# Patient Record
Sex: MALE | Race: WHITE | Employment: FULL TIME | URBAN - METROPOLITAN AREA
[De-identification: names, ages, dates, MRNs, and addresses within clinical notes are randomized per-mention and may not be internally consistent; named-entity substitution may affect disease eponyms.]

---

## 2017-10-06 PROBLEM — E11.9 CONTROLLED TYPE 2 DIABETES MELLITUS WITHOUT COMPLICATION, WITHOUT LONG-TERM CURRENT USE OF INSULIN (HCC): Status: ACTIVE | Noted: 2017-10-06

## 2017-10-06 PROBLEM — M51.26 HERNIATED LUMBAR INTERVERTEBRAL DISC: Status: ACTIVE | Noted: 2017-10-06

## 2017-10-06 PROBLEM — G89.29 CHRONIC RIGHT-SIDED LOW BACK PAIN WITHOUT SCIATICA: Status: ACTIVE | Noted: 2017-10-06

## 2017-10-06 PROBLEM — M54.50 CHRONIC RIGHT-SIDED LOW BACK PAIN WITHOUT SCIATICA: Status: ACTIVE | Noted: 2017-10-06

## 2017-11-16 PROBLEM — IMO0002 UNCONTROLLED TYPE 2 DIABETES MELLITUS WITH COMPLICATION: Status: ACTIVE | Noted: 2017-10-06

## 2017-11-16 PROBLEM — F17.210 CIGARETTE NICOTINE DEPENDENCE WITHOUT COMPLICATION: Status: ACTIVE | Noted: 2017-11-16

## 2017-11-16 PROBLEM — E11.65 UNCONTROLLED TYPE 2 DIABETES MELLITUS WITH COMPLICATION (HCC): Status: ACTIVE | Noted: 2017-10-06

## 2017-11-16 PROBLEM — E11.8 UNCONTROLLED TYPE 2 DIABETES MELLITUS WITH COMPLICATION (HCC): Status: ACTIVE | Noted: 2017-10-06

## 2018-01-08 PROCEDURE — 81003 URINALYSIS AUTO W/O SCOPE: CPT | Performed by: FAMILY MEDICINE

## 2018-01-08 PROCEDURE — 82570 ASSAY OF URINE CREATININE: CPT | Performed by: FAMILY MEDICINE

## 2018-01-08 PROCEDURE — 82043 UR ALBUMIN QUANTITATIVE: CPT | Performed by: FAMILY MEDICINE

## 2018-01-26 PROCEDURE — 87338 HPYLORI STOOL AG IA: CPT | Performed by: FAMILY MEDICINE

## 2018-08-27 PROCEDURE — 81003 URINALYSIS AUTO W/O SCOPE: CPT | Performed by: FAMILY MEDICINE

## 2018-08-27 PROCEDURE — 87086 URINE CULTURE/COLONY COUNT: CPT | Performed by: FAMILY MEDICINE

## 2019-11-04 PROBLEM — E78.2 MIXED HYPERLIPIDEMIA DUE TO TYPE 2 DIABETES MELLITUS (HCC): Status: ACTIVE | Noted: 2019-11-04

## 2019-11-04 PROBLEM — E78.2 MIXED HYPERLIPIDEMIA DUE TO TYPE 2 DIABETES MELLITUS: Status: ACTIVE | Noted: 2019-11-04

## 2019-11-04 PROBLEM — E11.69 MIXED HYPERLIPIDEMIA DUE TO TYPE 2 DIABETES MELLITUS (HCC): Status: ACTIVE | Noted: 2019-11-04

## 2019-11-04 PROBLEM — E78.2 MIXED HYPERLIPIDEMIA DUE TO TYPE 2 DIABETES MELLITUS  (HCC): Status: ACTIVE | Noted: 2019-11-04

## 2019-11-04 PROBLEM — E11.69 MIXED HYPERLIPIDEMIA DUE TO TYPE 2 DIABETES MELLITUS  (HCC): Status: ACTIVE | Noted: 2019-11-04

## 2019-11-04 PROBLEM — E11.69 MIXED HYPERLIPIDEMIA DUE TO TYPE 2 DIABETES MELLITUS: Status: ACTIVE | Noted: 2019-11-04

## 2022-05-19 ENCOUNTER — APPOINTMENT (OUTPATIENT)
Dept: CT IMAGING | Age: 47
End: 2022-05-19
Attending: EMERGENCY MEDICINE
Payer: COMMERCIAL

## 2022-05-19 ENCOUNTER — APPOINTMENT (OUTPATIENT)
Dept: GENERAL RADIOLOGY | Age: 47
End: 2022-05-19
Attending: EMERGENCY MEDICINE
Payer: COMMERCIAL

## 2022-05-19 ENCOUNTER — HOSPITAL ENCOUNTER (INPATIENT)
Facility: HOSPITAL | Age: 47
LOS: 1 days | Discharge: HOME | End: 2022-05-21
Attending: EMERGENCY MEDICINE | Admitting: SURGERY
Payer: COMMERCIAL

## 2022-05-19 DIAGNOSIS — S27.2XXA TRAUMATIC PNEUMOHEMOTHORAX, INITIAL ENCOUNTER: Primary | ICD-10-CM

## 2022-05-19 DIAGNOSIS — S22.42XA CLOSED FRACTURE OF MULTIPLE RIBS OF LEFT SIDE, INITIAL ENCOUNTER: ICD-10-CM

## 2022-05-19 LAB
ALBUMIN SERPL-MCNC: 4 G/DL (ref 3.4–5)
ALBUMIN/GLOB SERPL: 1 {RATIO} (ref 1–2)
ALP LIVER SERPL-CCNC: 100 U/L
ALT SERPL-CCNC: 52 U/L
ANION GAP SERPL CALC-SCNC: 11 MMOL/L (ref 0–18)
AST SERPL-CCNC: 22 U/L (ref 15–37)
BASOPHILS # BLD AUTO: 0.11 X10(3) UL (ref 0–0.2)
BASOPHILS NFR BLD AUTO: 1.1 %
BILIRUB SERPL-MCNC: 0.2 MG/DL (ref 0.1–2)
BUN BLD-MCNC: 18 MG/DL (ref 7–18)
CALCIUM BLD-MCNC: 9.9 MG/DL (ref 8.5–10.1)
CHLORIDE SERPL-SCNC: 106 MMOL/L (ref 98–112)
CO2 SERPL-SCNC: 22 MMOL/L (ref 21–32)
CREAT BLD-MCNC: 1.08 MG/DL
EOSINOPHIL # BLD AUTO: 0.49 X10(3) UL (ref 0–0.7)
EOSINOPHIL NFR BLD AUTO: 5 %
ERYTHROCYTE [DISTWIDTH] IN BLOOD BY AUTOMATED COUNT: 12.4 %
ETHANOL SERPL-MCNC: 56 MG/DL (ref ?–3)
GLOBULIN PLAS-MCNC: 3.9 G/DL (ref 2.8–4.4)
GLUCOSE BLD-MCNC: 196 MG/DL (ref 70–99)
HCT VFR BLD AUTO: 48.8 %
HGB BLD-MCNC: 16.9 G/DL
IMM GRANULOCYTES # BLD AUTO: 0.12 X10(3) UL (ref 0–1)
IMM GRANULOCYTES NFR BLD: 1.2 %
LYMPHOCYTES # BLD AUTO: 3.01 X10(3) UL (ref 1–4)
LYMPHOCYTES NFR BLD AUTO: 30.6 %
MCH RBC QN AUTO: 31 PG (ref 26–34)
MCHC RBC AUTO-ENTMCNC: 34.6 G/DL (ref 31–37)
MCV RBC AUTO: 89.5 FL
MONOCYTES # BLD AUTO: 0.72 X10(3) UL (ref 0.1–1)
MONOCYTES NFR BLD AUTO: 7.3 %
NEUTROPHILS # BLD AUTO: 5.4 X10 (3) UL (ref 1.5–7.7)
NEUTROPHILS # BLD AUTO: 5.4 X10(3) UL (ref 1.5–7.7)
NEUTROPHILS NFR BLD AUTO: 54.8 %
OSMOLALITY SERPL CALC.SUM OF ELEC: 295 MOSM/KG (ref 275–295)
PLATELET # BLD AUTO: 266 10(3)UL (ref 150–450)
POTASSIUM SERPL-SCNC: 3.8 MMOL/L (ref 3.5–5.1)
PROT SERPL-MCNC: 7.9 G/DL (ref 6.4–8.2)
RBC # BLD AUTO: 5.45 X10(6)UL
SARS-COV-2 RNA RESP QL NAA+PROBE: NOT DETECTED
SODIUM SERPL-SCNC: 139 MMOL/L (ref 136–145)
TROPONIN I HIGH SENSITIVITY: <3 NG/L
WBC # BLD AUTO: 9.9 X10(3) UL (ref 4–11)

## 2022-05-19 PROCEDURE — 71260 CT THORAX DX C+: CPT | Performed by: EMERGENCY MEDICINE

## 2022-05-19 PROCEDURE — 72125 CT NECK SPINE W/O DYE: CPT | Performed by: EMERGENCY MEDICINE

## 2022-05-19 PROCEDURE — 0W9B30Z DRAINAGE OF LEFT PLEURAL CAVITY WITH DRAINAGE DEVICE, PERCUTANEOUS APPROACH: ICD-10-PCS | Performed by: EMERGENCY MEDICINE

## 2022-05-19 PROCEDURE — 3046F HEMOGLOBIN A1C LEVEL >9.0%: CPT | Performed by: FAMILY MEDICINE

## 2022-05-19 PROCEDURE — 70450 CT HEAD/BRAIN W/O DYE: CPT | Performed by: EMERGENCY MEDICINE

## 2022-05-19 PROCEDURE — 71045 X-RAY EXAM CHEST 1 VIEW: CPT | Performed by: EMERGENCY MEDICINE

## 2022-05-19 PROCEDURE — 74177 CT ABD & PELVIS W/CONTRAST: CPT | Performed by: EMERGENCY MEDICINE

## 2022-05-19 RX ORDER — HYDROMORPHONE HYDROCHLORIDE 1 MG/ML
0.5 INJECTION, SOLUTION INTRAMUSCULAR; INTRAVENOUS; SUBCUTANEOUS EVERY 30 MIN PRN
Status: DISCONTINUED | OUTPATIENT
Start: 2022-05-19 | End: 2022-05-19

## 2022-05-19 RX ORDER — MORPHINE SULFATE 4 MG/ML
4 INJECTION, SOLUTION INTRAMUSCULAR; INTRAVENOUS ONCE
Status: COMPLETED | OUTPATIENT
Start: 2022-05-19 | End: 2022-05-19

## 2022-05-19 RX ORDER — HYDROMORPHONE HYDROCHLORIDE 1 MG/ML
1 INJECTION, SOLUTION INTRAMUSCULAR; INTRAVENOUS; SUBCUTANEOUS EVERY 30 MIN PRN
Status: COMPLETED | OUTPATIENT
Start: 2022-05-19 | End: 2022-05-20

## 2022-05-19 RX ORDER — KETOROLAC TROMETHAMINE 15 MG/ML
15 INJECTION, SOLUTION INTRAMUSCULAR; INTRAVENOUS ONCE
Status: COMPLETED | OUTPATIENT
Start: 2022-05-19 | End: 2022-05-19

## 2022-05-20 ENCOUNTER — APPOINTMENT (OUTPATIENT)
Dept: GENERAL RADIOLOGY | Facility: HOSPITAL | Age: 47
End: 2022-05-20
Attending: PHYSICIAN ASSISTANT
Payer: COMMERCIAL

## 2022-05-20 PROBLEM — S22.42XA CLOSED FRACTURE OF MULTIPLE RIBS OF LEFT SIDE, INITIAL ENCOUNTER: Status: ACTIVE | Noted: 2022-05-20

## 2022-05-20 LAB
AMPHET UR QL SCN: NEGATIVE
APTT PPP: 24.5 SECONDS (ref 23.3–35.6)
BENZODIAZ UR QL SCN: NEGATIVE
CANNABINOIDS UR QL SCN: NEGATIVE
CREAT UR-SCNC: 90 MG/DL
EST. AVERAGE GLUCOSE BLD GHB EST-MCNC: 258 MG/DL (ref 68–126)
GLUCOSE BLD-MCNC: 175 MG/DL (ref 70–99)
GLUCOSE BLD-MCNC: 188 MG/DL (ref 70–99)
GLUCOSE BLD-MCNC: 263 MG/DL (ref 70–99)
GLUCOSE BLD-MCNC: 303 MG/DL (ref 70–99)
GLUCOSE BLD-MCNC: 313 MG/DL (ref 70–99)
HBA1C MFR BLD: 10.6 % (ref ?–5.7)
INR BLD: 0.96 (ref 0.8–1.2)
MDMA UR QL SCN: NEGATIVE
PROTHROMBIN TIME: 12.8 SECONDS (ref 11.6–14.8)

## 2022-05-20 PROCEDURE — 99254 IP/OBS CNSLTJ NEW/EST MOD 60: CPT | Performed by: SURGERY

## 2022-05-20 PROCEDURE — 71045 X-RAY EXAM CHEST 1 VIEW: CPT | Performed by: PHYSICIAN ASSISTANT

## 2022-05-20 RX ORDER — KETOROLAC TROMETHAMINE 30 MG/ML
30 INJECTION, SOLUTION INTRAMUSCULAR; INTRAVENOUS EVERY 6 HOURS PRN
Status: DISCONTINUED | OUTPATIENT
Start: 2022-05-20 | End: 2022-05-21

## 2022-05-20 RX ORDER — HYDROCODONE BITARTRATE AND ACETAMINOPHEN 5; 325 MG/1; MG/1
1 TABLET ORAL EVERY 4 HOURS PRN
Status: DISCONTINUED | OUTPATIENT
Start: 2022-05-20 | End: 2022-05-20

## 2022-05-20 RX ORDER — NICOTINE POLACRILEX 4 MG
15 LOZENGE BUCCAL
Status: DISCONTINUED | OUTPATIENT
Start: 2022-05-20 | End: 2022-05-21

## 2022-05-20 RX ORDER — KETOROLAC TROMETHAMINE 15 MG/ML
15 INJECTION, SOLUTION INTRAMUSCULAR; INTRAVENOUS EVERY 6 HOURS PRN
Status: DISCONTINUED | OUTPATIENT
Start: 2022-05-20 | End: 2022-05-21

## 2022-05-20 RX ORDER — ACETAMINOPHEN 500 MG
500 TABLET ORAL EVERY 6 HOURS PRN
Status: DISCONTINUED | OUTPATIENT
Start: 2022-05-20 | End: 2022-05-21

## 2022-05-20 RX ORDER — HEPARIN SODIUM 5000 [USP'U]/ML
5000 INJECTION, SOLUTION INTRAVENOUS; SUBCUTANEOUS EVERY 8 HOURS SCHEDULED
Status: DISCONTINUED | OUTPATIENT
Start: 2022-05-20 | End: 2022-05-21

## 2022-05-20 RX ORDER — OXYCODONE HYDROCHLORIDE 5 MG/1
5 TABLET ORAL EVERY 4 HOURS PRN
Status: DISCONTINUED | OUTPATIENT
Start: 2022-05-20 | End: 2022-05-21

## 2022-05-20 RX ORDER — DEXTROSE MONOHYDRATE 25 G/50ML
50 INJECTION, SOLUTION INTRAVENOUS
Status: DISCONTINUED | OUTPATIENT
Start: 2022-05-20 | End: 2022-05-21

## 2022-05-20 RX ORDER — NICOTINE POLACRILEX 4 MG
30 LOZENGE BUCCAL
Status: DISCONTINUED | OUTPATIENT
Start: 2022-05-20 | End: 2022-05-21

## 2022-05-20 RX ORDER — PROCHLORPERAZINE EDISYLATE 5 MG/ML
5 INJECTION INTRAMUSCULAR; INTRAVENOUS EVERY 8 HOURS PRN
Status: DISCONTINUED | OUTPATIENT
Start: 2022-05-20 | End: 2022-05-21

## 2022-05-20 RX ORDER — LORAZEPAM 2 MG/ML
1 INJECTION INTRAMUSCULAR
Status: DISCONTINUED | OUTPATIENT
Start: 2022-05-20 | End: 2022-05-21

## 2022-05-20 RX ORDER — LORAZEPAM 1 MG/1
1 TABLET ORAL
Status: DISCONTINUED | OUTPATIENT
Start: 2022-05-20 | End: 2022-05-21

## 2022-05-20 RX ORDER — ACETAMINOPHEN 500 MG
1000 TABLET ORAL EVERY 6 HOURS PRN
Status: DISCONTINUED | OUTPATIENT
Start: 2022-05-20 | End: 2022-05-21

## 2022-05-20 RX ORDER — LORAZEPAM 2 MG/ML
2 INJECTION INTRAMUSCULAR
Status: DISCONTINUED | OUTPATIENT
Start: 2022-05-20 | End: 2022-05-21

## 2022-05-20 RX ORDER — ONDANSETRON 2 MG/ML
4 INJECTION INTRAMUSCULAR; INTRAVENOUS EVERY 6 HOURS PRN
Status: DISCONTINUED | OUTPATIENT
Start: 2022-05-20 | End: 2022-05-21

## 2022-05-20 RX ORDER — LORAZEPAM 1 MG/1
2 TABLET ORAL
Status: DISCONTINUED | OUTPATIENT
Start: 2022-05-20 | End: 2022-05-21
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
## 2022-05-20 NOTE — ED QUICK NOTES
Orders for admission, patient is aware of plan and ready to go upstairs. Any questions, please call ED JOSE ALFREDO Fermin at extension [phone]. Vaccinated? no  Type of COVID test sent: Rapid   COVID Suspicion level: Low      Titratable drug(s) infusing: none  Rate: n/a    LOC at time of transport:  A+Ox3    Other pertinent information: chest tube     CIWA score=n/a   NIH score=n/a

## 2022-05-20 NOTE — BH PROGRESS NOTE
The patient was seen by Joan [facility] for possible cd treatment options. The patient declined services and stated he didn't have a problem. [facility] Intervention Note up loaded to this record.

## 2022-05-20 NOTE — PLAN OF CARE
NURSING ADMISSION NOTE      Patient admitted via ambulance from  ER, accompanied by wife  Joan on 3L/NC, sats >95%  Chest tube -20cm attached to continous suction  C/o left sided incision site pain 7/10, pain meds given. Ciwa - 0-3  C/o hungry, not eaten yet. foods given. Pt is only taking glipizide, not taking BP med. Pt not checking his blood sugar at home, pt has glucometer at home. Pamplet on diabetes education given. Per pt he didn't drink alcohol prior to admission. He drinks alcohol not everyday bu admitted he used cocaine before the admission  Labs in am  Will continue to monitor. Oriented to room. Safety precautions initiated. Bed in low position. Call light in reach.   Problem: Diabetes/Glucose Control  Goal: Glucose maintained within prescribed range  Description: INTERVENTIONS:  - Monitor Blood Glucose as ordered  - Assess for signs and symptoms of hyperglycemia and hypoglycemia  - Administer ordered medications to maintain glucose within target range  - Assess barriers to adequate nutritional intake and initiate nutrition consult as needed  - Instruct patient on self management of diabetes  Outcome: Progressing     Problem: Patient/Family Goals  Goal: Patient/Family Long Term Goal  Description: Patient's Long Term Goal:resolve pneumothorax    Interventions:  - Chest tube -20cm  -consult  -comply with POC  - See additional Care Plan goals for specific interventions  Outcome: Progressing  Goal: Patient/Family Short Term Goal  Description: Patient's Short Term Goal:pain controlled    Interventions:   - pain med as needed  - See additional Care Plan goals for specific interventions  Outcome: Progressing     Problem: RESPIRATORY - ADULT  Goal: Achieves optimal ventilation and oxygenation  Description: INTERVENTIONS:  - Assess for changes in respiratory status  - Assess for changes in mentation and behavior  - Position to facilitate oxygenation and minimize respiratory effort  - Oxygen supplementation based on oxygen saturation or ABGs  - Provide Smoking Cessation handout, if applicable  - Encourage broncho-pulmonary hygiene including cough, deep breathe, Incentive Spirometry  - Assess the need for suctioning and perform as needed  - Assess and instruct to report SOB or any respiratory difficulty  - Respiratory Therapy support as indicated  - Manage/alleviate anxiety  - Monitor for signs/symptoms of CO2 retention  Outcome: Progressing     Problem: PAIN - ADULT  Goal: Verbalizes/displays adequate comfort level or patient's stated pain goal  Description: INTERVENTIONS:  - Encourage pt to monitor pain and request assistance  - Assess pain using appropriate pain scale  - Administer analgesics based on type and severity of pain and evaluate response  - Implement non-pharmacological measures as appropriate and evaluate response  - Consider cultural and social influences on pain and pain management  - Manage/alleviate anxiety  - Utilize distraction and/or relaxation techniques  - Monitor for opioid side effects  - Notify MD/LIP if interventions unsuccessful or patient reports new pain  - Anticipate increased pain with activity and pre-medicate as appropriate  Outcome: Progressing

## 2022-05-20 NOTE — PROGRESS NOTES
Consult request received from ER; case discussed and data reviewed. Events noted. Left ribs fx'd and moderate PTx; SpO2 declines off O2    Recommend Pigtail Catheter for PTx + supplemental O2  Trauma to manage chest tube    Optimize analgesia    Imagine reveals no other traumatic injuries; risk for pulmonary contusion. Full consult to follow.        Janneth Cronin MD

## 2022-05-21 ENCOUNTER — APPOINTMENT (OUTPATIENT)
Dept: GENERAL RADIOLOGY | Facility: HOSPITAL | Age: 47
End: 2022-05-21
Payer: COMMERCIAL

## 2022-05-21 VITALS
HEIGHT: 70 IN | SYSTOLIC BLOOD PRESSURE: 139 MMHG | DIASTOLIC BLOOD PRESSURE: 90 MMHG | TEMPERATURE: 98 F | WEIGHT: 192.81 LBS | HEART RATE: 88 BPM | BODY MASS INDEX: 27.6 KG/M2 | OXYGEN SATURATION: 97 % | RESPIRATION RATE: 15 BRPM

## 2022-05-21 DIAGNOSIS — S27.2XXD TRAUMATIC PNEUMOHEMOTHORAX, SUBSEQUENT ENCOUNTER: Primary | ICD-10-CM

## 2022-05-21 LAB
ANION GAP SERPL CALC-SCNC: 0 MMOL/L (ref 0–18)
BUN BLD-MCNC: 16 MG/DL (ref 7–18)
CALCIUM BLD-MCNC: 8.8 MG/DL (ref 8.5–10.1)
CHLORIDE SERPL-SCNC: 107 MMOL/L (ref 98–112)
CHOLEST SERPL-MCNC: 193 MG/DL (ref ?–200)
CO2 SERPL-SCNC: 26 MMOL/L (ref 21–32)
CREAT BLD-MCNC: 0.83 MG/DL
GLUCOSE BLD-MCNC: 203 MG/DL (ref 70–99)
GLUCOSE BLD-MCNC: 203 MG/DL (ref 70–99)
GLUCOSE BLD-MCNC: 221 MG/DL (ref 70–99)
HDLC SERPL-MCNC: 53 MG/DL (ref 40–59)
LDLC SERPL CALC-MCNC: 117 MG/DL (ref ?–100)
MAGNESIUM SERPL-MCNC: 2 MG/DL (ref 1.6–2.6)
NONHDLC SERPL-MCNC: 140 MG/DL (ref ?–130)
OSMOLALITY SERPL CALC.SUM OF ELEC: 283 MOSM/KG (ref 275–295)
POTASSIUM SERPL-SCNC: 3.9 MMOL/L (ref 3.5–5.1)
SODIUM SERPL-SCNC: 133 MMOL/L (ref 136–145)
TRIGL SERPL-MCNC: 132 MG/DL (ref 30–149)
VLDLC SERPL CALC-MCNC: 23 MG/DL (ref 0–30)

## 2022-05-21 PROCEDURE — 71045 X-RAY EXAM CHEST 1 VIEW: CPT

## 2022-05-21 PROCEDURE — 99232 SBSQ HOSP IP/OBS MODERATE 35: CPT | Performed by: SURGERY

## 2022-05-21 RX ORDER — NALOXONE HYDROCHLORIDE 4 MG/.1ML
4 SPRAY, METERED NASAL AS NEEDED
Qty: 1 KIT | Refills: 0 | Status: SHIPPED | OUTPATIENT
Start: 2022-05-21

## 2022-05-21 RX ORDER — OXYCODONE HYDROCHLORIDE 5 MG/1
5 TABLET ORAL EVERY 4 HOURS PRN
Qty: 20 TABLET | Refills: 0 | Status: SHIPPED | OUTPATIENT
Start: 2022-05-21 | End: 2022-05-26

## 2022-05-21 RX ORDER — OXYCODONE HYDROCHLORIDE 5 MG/1
5 CAPSULE ORAL EVERY 4 HOURS PRN
Qty: 20 CAPSULE | Refills: 0 | Status: SHIPPED | OUTPATIENT
Start: 2022-05-21

## 2022-05-21 RX ORDER — ACETAMINOPHEN 500 MG
500 TABLET ORAL EVERY 6 HOURS PRN
Refills: 0 | Status: SHIPPED | COMMUNITY
Start: 2022-05-21 | End: 2022-05-21

## 2022-05-21 RX ORDER — ACETAMINOPHEN 500 MG
1000 TABLET ORAL EVERY 8 HOURS
Qty: 120 TABLET | Refills: 0 | Status: SHIPPED | OUTPATIENT
Start: 2022-05-21 | End: 2022-06-10

## 2022-05-21 NOTE — PROGRESS NOTES
NURSING DISCHARGE NOTE    Discharged Home via Ambulatory. Accompanied by Family member  Belongings Taken by patient/family. IV and tele removed. The patient verbalized understanding of discharge instructions.

## 2022-05-21 NOTE — PLAN OF CARE
Pt is A&O x4. VSS- NSR on tele. RA. Lung sounds diminished but clear. Chest tube to left anterior chest currently to -20cm suction. Dressing is CD&I. Pt c/o left sided rib cage and left shoulder pain management with PRN medications. Seizure precautions in place. CIWA monitoring discontinued this shift. Pt on carb controlled diet with accuchecks QID. SubQ heparin for VTE proph. Gen surg following case. Plan for place chest tube to water seal at 0600 then repeat CXR at 0800.       Problem: Diabetes/Glucose Control  Goal: Glucose maintained within prescribed range  Description: INTERVENTIONS:  - Monitor Blood Glucose as ordered  - Assess for signs and symptoms of hyperglycemia and hypoglycemia  - Administer ordered medications to maintain glucose within target range  - Assess barriers to adequate nutritional intake and initiate nutrition consult as needed  - Instruct patient on self management of diabetes  Outcome: Progressing     Problem: Patient/Family Goals  Goal: Patient/Family Long Term Goal  Description: Patient's Long Term Goal: \"go home\"    Interventions:  - Chest tube management  -Gen surg consult  -pain control  - See additional Care Plan goals for specific interventions  Outcome: Progressing  Goal: Patient/Family Short Term Goal  Description: Patient's Short Term Goal: pain < 4/10    Interventions:   - PRN pain medications  - See additional Care Plan goals for specific interventions  Outcome: Progressing     Problem: RESPIRATORY - ADULT  Goal: Achieves optimal ventilation and oxygenation  Description: INTERVENTIONS:  - Assess for changes in respiratory status  - Assess for changes in mentation and behavior  - Position to facilitate oxygenation and minimize respiratory effort  - Oxygen supplementation based on oxygen saturation or ABGs  - Provide Smoking Cessation handout, if applicable  - Encourage broncho-pulmonary hygiene including cough, deep breathe, Incentive Spirometry  - Assess the need for suctioning and perform as needed  - Assess and instruct to report SOB or any respiratory difficulty  - Respiratory Therapy support as indicated  - Manage/alleviate anxiety  - Monitor for signs/symptoms of CO2 retention  Outcome: Progressing     Problem: PAIN - ADULT  Goal: Verbalizes/displays adequate comfort level or patient's stated pain goal  Description: INTERVENTIONS:  - Encourage pt to monitor pain and request assistance  - Assess pain using appropriate pain scale  - Administer analgesics based on type and severity of pain and evaluate response  - Implement non-pharmacological measures as appropriate and evaluate response  - Consider cultural and social influences on pain and pain management  - Manage/alleviate anxiety  - Utilize distraction and/or relaxation techniques  - Monitor for opioid side effects  - Notify MD/LIP if interventions unsuccessful or patient reports new pain  - Anticipate increased pain with activity and pre-medicate as appropriate  Outcome: Progressing

## 2022-05-21 NOTE — PROGRESS NOTES
Repeat CXR completed this morning. General surgery notified of the results. Orders received to remove the chest tube. Chest tube removed @ 1300, tolerated well by the patient. The patient monitored for 2 hours, no complications. General surgery notified. Cleared for discharge.

## 2022-05-21 NOTE — PLAN OF CARE
The patient is A/O x4, on RA, no SOB, NSR on tele, pain management PRN Tylenol and Toradol per MAR. Lung sounds clear/diminished. QID accuchecks. Ambulatory. Chest tube to water seal to L anterior chest. Repeat CXR completed this morning, awaiting results. Gen surgery is on consult. All care needs met. Safety precautions in place. Staff will continue to monitor.        Problem: Diabetes/Glucose Control  Goal: Glucose maintained within prescribed range  Description: INTERVENTIONS:  - Monitor Blood Glucose as ordered  - Assess for signs and symptoms of hyperglycemia and hypoglycemia  - Administer ordered medications to maintain glucose within target range  - Assess barriers to adequate nutritional intake and initiate nutrition consult as needed  - Instruct patient on self management of diabetes  Outcome: Progressing     Problem: Patient/Family Goals  Goal: Patient/Family Long Term Goal  Description: Patient's Long Term Goal: \"go home\"    Interventions:  - Chest tube management  -Gen surg consult  -pain control  - See additional Care Plan goals for specific interventions  Outcome: Progressing  Goal: Patient/Family Short Term Goal  Description: Patient's Short Term Goal: pain < 4/10    Interventions:   - PRN pain medications  - See additional Care Plan goals for specific interventions  Outcome: Progressing     Problem: RESPIRATORY - ADULT  Goal: Achieves optimal ventilation and oxygenation  Description: INTERVENTIONS:  - Assess for changes in respiratory status  - Assess for changes in mentation and behavior  - Position to facilitate oxygenation and minimize respiratory effort  - Oxygen supplementation based on oxygen saturation or ABGs  - Provide Smoking Cessation handout, if applicable  - Encourage broncho-pulmonary hygiene including cough, deep breathe, Incentive Spirometry  - Assess the need for suctioning and perform as needed  - Assess and instruct to report SOB or any respiratory difficulty  - Respiratory Therapy support as indicated  - Manage/alleviate anxiety  - Monitor for signs/symptoms of CO2 retention  Outcome: Progressing     Problem: PAIN - ADULT  Goal: Verbalizes/displays adequate comfort level or patient's stated pain goal  Description: INTERVENTIONS:  - Encourage pt to monitor pain and request assistance  - Assess pain using appropriate pain scale  - Administer analgesics based on type and severity of pain and evaluate response  - Implement non-pharmacological measures as appropriate and evaluate response  - Consider cultural and social influences on pain and pain management  - Manage/alleviate anxiety  - Utilize distraction and/or relaxation techniques  - Monitor for opioid side effects  - Notify MD/LIP if interventions unsuccessful or patient reports new pain  - Anticipate increased pain with activity and pre-medicate as appropriate  Outcome: Progressing

## 2022-05-21 NOTE — PLAN OF CARE
52year old male a/o x 4. Chest drain C/D/I. VSS. X ray scheduled for AM-- water seal chest drain at 6 AM for 8AM xray.    Problem: Diabetes/Glucose Control  Goal: Glucose maintained within prescribed range  Description: INTERVENTIONS:  - Monitor Blood Glucose as ordered  - Assess for signs and symptoms of hyperglycemia and hypoglycemia  - Administer ordered medications to maintain glucose within target range  - Assess barriers to adequate nutritional intake and initiate nutrition consult as needed  - Instruct patient on self management of diabetes  Outcome: Progressing

## 2022-05-22 LAB
BENZOYLECGONINE QUANT: >1000 NG/ML
OPIATES, UR, 6-ACETYLMORPHINE: <10 NG/ML
OPIATES, URINE, CODEINE: <20 NG/ML
OPIATES, URINE, HYDROCODONE: <20 NG/ML
OPIATES, URINE, HYDROMORPHONE: 139 NG/ML
OPIATES, URINE, MORPHINE: 1057 NG/ML
OPIATES, URINE, NORHYDROCODONE: <20 NG/ML
OPIATES, URINE, NOROXYCODONE: <20 NG/ML
OPIATES, URINE, NOROXYMORPHONE: <20 NG/ML
OPIATES, URINE, OXYCODONE: <20 NG/ML
OPIATES, URINE, OXYMORPHONE: <20 NG/ML

## 2022-05-23 LAB
ATRIAL RATE: 91 BPM
P AXIS: 46 DEGREES
P-R INTERVAL: 166 MS
Q-T INTERVAL: 348 MS
QRS DURATION: 88 MS
QTC CALCULATION (BEZET): 428 MS
R AXIS: 20 DEGREES
T AXIS: 31 DEGREES
VENTRICULAR RATE: 91 BPM

## 2022-05-23 RX ORDER — HYDROCODONE BITARTRATE AND ACETAMINOPHEN 5; 325 MG/1; MG/1
1 TABLET ORAL EVERY 6 HOURS PRN
Qty: 15 TABLET | Refills: 0 | Status: SHIPPED | OUTPATIENT
Start: 2022-05-23

## 2022-05-26 ENCOUNTER — OFFICE VISIT (OUTPATIENT)
Dept: FAMILY MEDICINE CLINIC | Facility: CLINIC | Age: 47
End: 2022-05-26
Payer: COMMERCIAL

## 2022-05-26 VITALS
WEIGHT: 184 LBS | OXYGEN SATURATION: 99 % | SYSTOLIC BLOOD PRESSURE: 130 MMHG | HEIGHT: 70 IN | HEART RATE: 72 BPM | BODY MASS INDEX: 26.34 KG/M2 | RESPIRATION RATE: 16 BRPM | DIASTOLIC BLOOD PRESSURE: 82 MMHG | TEMPERATURE: 98 F

## 2022-05-26 DIAGNOSIS — G89.29 CHRONIC BILATERAL LOW BACK PAIN WITHOUT SCIATICA: ICD-10-CM

## 2022-05-26 DIAGNOSIS — E11.65 TYPE 2 DIABETES MELLITUS WITH HYPERGLYCEMIA, WITHOUT LONG-TERM CURRENT USE OF INSULIN (HCC): ICD-10-CM

## 2022-05-26 DIAGNOSIS — S22.42XA CLOSED FRACTURE OF MULTIPLE RIBS OF LEFT SIDE, INITIAL ENCOUNTER: Primary | ICD-10-CM

## 2022-05-26 DIAGNOSIS — Z12.11 COLON CANCER SCREENING: ICD-10-CM

## 2022-05-26 DIAGNOSIS — S27.2XXD TRAUMATIC PNEUMOHEMOTHORAX, SUBSEQUENT ENCOUNTER: ICD-10-CM

## 2022-05-26 DIAGNOSIS — M54.50 CHRONIC BILATERAL LOW BACK PAIN WITHOUT SCIATICA: ICD-10-CM

## 2022-05-26 PROBLEM — H54.40 BLIND LEFT EYE: Status: ACTIVE | Noted: 2022-05-26

## 2022-05-26 PROBLEM — S27.2XXA: Status: RESOLVED | Noted: 2022-05-19 | Resolved: 2022-05-26

## 2022-05-26 LAB
CREAT UR-SCNC: 116 MG/DL
MICROALBUMIN UR-MCNC: 1.71 MG/DL
MICROALBUMIN/CREAT 24H UR-RTO: 14.7 UG/MG (ref ?–30)

## 2022-05-26 PROCEDURE — 99214 OFFICE O/P EST MOD 30 MIN: CPT | Performed by: FAMILY MEDICINE

## 2022-05-26 PROCEDURE — 82570 ASSAY OF URINE CREATININE: CPT | Performed by: FAMILY MEDICINE

## 2022-05-26 PROCEDURE — 3075F SYST BP GE 130 - 139MM HG: CPT | Performed by: FAMILY MEDICINE

## 2022-05-26 PROCEDURE — 3008F BODY MASS INDEX DOCD: CPT | Performed by: FAMILY MEDICINE

## 2022-05-26 PROCEDURE — 3079F DIAST BP 80-89 MM HG: CPT | Performed by: FAMILY MEDICINE

## 2022-05-26 PROCEDURE — 3061F NEG MICROALBUMINURIA REV: CPT | Performed by: FAMILY MEDICINE

## 2022-05-26 PROCEDURE — 82043 UR ALBUMIN QUANTITATIVE: CPT | Performed by: FAMILY MEDICINE

## 2022-06-05 RX ORDER — HYDROCODONE BITARTRATE AND ACETAMINOPHEN 5; 325 MG/1; MG/1
1 TABLET ORAL EVERY 6 HOURS PRN
Qty: 15 TABLET | Refills: 0 | OUTPATIENT
Start: 2022-06-05

## 2022-06-06 RX ORDER — HYDROCODONE BITARTRATE AND ACETAMINOPHEN 5; 325 MG/1; MG/1
1 TABLET ORAL EVERY 6 HOURS PRN
Qty: 15 TABLET | Refills: 0 | OUTPATIENT
Start: 2022-06-06

## 2022-06-07 RX ORDER — HYDROCODONE BITARTRATE AND ACETAMINOPHEN 5; 325 MG/1; MG/1
1 TABLET ORAL EVERY 6 HOURS PRN
Qty: 15 TABLET | Refills: 0 | OUTPATIENT
Start: 2022-06-07

## 2022-08-17 DIAGNOSIS — E11.65 UNCONTROLLED TYPE 2 DIABETES MELLITUS WITH HYPERGLYCEMIA (HCC): ICD-10-CM

## 2022-08-17 RX ORDER — GLIPIZIDE 10 MG/1
10 TABLET, FILM COATED, EXTENDED RELEASE ORAL DAILY
Qty: 90 TABLET | Refills: 1 | Status: SHIPPED | OUTPATIENT
Start: 2022-08-17

## 2022-08-17 NOTE — TELEPHONE ENCOUNTER
Spouse calling, requesting refill on Glipizide 10mg, prescribed by previous doctor. Requesting refill from current PCP.

## 2022-09-06 ENCOUNTER — OFFICE VISIT (OUTPATIENT)
Dept: FAMILY MEDICINE CLINIC | Facility: CLINIC | Age: 47
End: 2022-09-06
Payer: COMMERCIAL

## 2022-09-06 VITALS
SYSTOLIC BLOOD PRESSURE: 122 MMHG | HEART RATE: 88 BPM | DIASTOLIC BLOOD PRESSURE: 74 MMHG | RESPIRATION RATE: 16 BRPM | OXYGEN SATURATION: 97 % | WEIGHT: 188 LBS | TEMPERATURE: 98 F | HEIGHT: 70 IN | BODY MASS INDEX: 26.92 KG/M2

## 2022-09-06 DIAGNOSIS — M79.671 PAIN IN BOTH FEET: ICD-10-CM

## 2022-09-06 DIAGNOSIS — M79.671 BILATERAL FOOT PAIN: ICD-10-CM

## 2022-09-06 DIAGNOSIS — M79.672 PAIN IN BOTH FEET: ICD-10-CM

## 2022-09-06 DIAGNOSIS — M79.672 BILATERAL FOOT PAIN: ICD-10-CM

## 2022-09-06 DIAGNOSIS — R10.31 RLQ ABDOMINAL PAIN: Primary | ICD-10-CM

## 2022-09-06 PROCEDURE — 99214 OFFICE O/P EST MOD 30 MIN: CPT | Performed by: FAMILY MEDICINE

## 2022-09-06 PROCEDURE — 3074F SYST BP LT 130 MM HG: CPT | Performed by: FAMILY MEDICINE

## 2022-09-06 PROCEDURE — 3078F DIAST BP <80 MM HG: CPT | Performed by: FAMILY MEDICINE

## 2022-09-06 PROCEDURE — 3008F BODY MASS INDEX DOCD: CPT | Performed by: FAMILY MEDICINE

## 2022-09-28 ENCOUNTER — OFFICE VISIT (OUTPATIENT)
Dept: FAMILY MEDICINE CLINIC | Facility: CLINIC | Age: 47
End: 2022-09-28

## 2022-09-28 VITALS
TEMPERATURE: 97 F | SYSTOLIC BLOOD PRESSURE: 138 MMHG | WEIGHT: 191 LBS | HEART RATE: 90 BPM | RESPIRATION RATE: 14 BRPM | BODY MASS INDEX: 27.35 KG/M2 | DIASTOLIC BLOOD PRESSURE: 82 MMHG | HEIGHT: 70 IN | OXYGEN SATURATION: 95 %

## 2022-09-28 DIAGNOSIS — G89.29 CHRONIC RIGHT-SIDED LOW BACK PAIN WITHOUT SCIATICA: ICD-10-CM

## 2022-09-28 DIAGNOSIS — E11.65 TYPE 2 DIABETES MELLITUS WITH HYPERGLYCEMIA, WITHOUT LONG-TERM CURRENT USE OF INSULIN (HCC): Primary | ICD-10-CM

## 2022-09-28 DIAGNOSIS — M54.50 CHRONIC RIGHT-SIDED LOW BACK PAIN WITHOUT SCIATICA: ICD-10-CM

## 2022-09-28 PROBLEM — S27.2XXA TRAUMATIC PNEUMOTHORAX AND HEMOTHORAX: Status: RESOLVED | Noted: 2022-05-19 | Resolved: 2022-09-28

## 2022-09-28 PROBLEM — S22.42XA CLOSED FRACTURE OF MULTIPLE RIBS OF LEFT SIDE, INITIAL ENCOUNTER: Status: RESOLVED | Noted: 2022-05-20 | Resolved: 2022-09-28

## 2022-09-28 LAB — HEMOGLOBIN A1C: 10.6 % (ref 4.3–5.6)

## 2022-09-28 PROCEDURE — 3079F DIAST BP 80-89 MM HG: CPT | Performed by: FAMILY MEDICINE

## 2022-09-28 PROCEDURE — 99214 OFFICE O/P EST MOD 30 MIN: CPT | Performed by: FAMILY MEDICINE

## 2022-09-28 PROCEDURE — 3046F HEMOGLOBIN A1C LEVEL >9.0%: CPT | Performed by: FAMILY MEDICINE

## 2022-09-28 PROCEDURE — 3008F BODY MASS INDEX DOCD: CPT | Performed by: FAMILY MEDICINE

## 2022-09-28 PROCEDURE — 3075F SYST BP GE 130 - 139MM HG: CPT | Performed by: FAMILY MEDICINE

## 2022-09-28 RX ORDER — NAPROXEN 500 MG/1
500 TABLET ORAL 2 TIMES DAILY PRN
Qty: 60 TABLET | Refills: 1 | Status: SHIPPED | OUTPATIENT
Start: 2022-09-28

## 2022-09-28 RX ORDER — GABAPENTIN 100 MG/1
100 CAPSULE ORAL NIGHTLY
Qty: 30 CAPSULE | Refills: 2 | Status: SHIPPED | OUTPATIENT
Start: 2022-09-28

## 2022-11-09 ENCOUNTER — TELEPHONE (OUTPATIENT)
Dept: FAMILY MEDICINE CLINIC | Facility: CLINIC | Age: 47
End: 2022-11-09

## 2022-11-09 NOTE — TELEPHONE ENCOUNTER
Future Appointments   Date Time Provider Kade Saldana   11/10/2022  5:30 PM Rosana Zamora MD [facility] [address] [facility] [address]   11/22/2022 10:00 AM Bon Brambila MD [facility]

## 2022-11-09 NOTE — TELEPHONE ENCOUNTER
Patient's spouse calling, has slight cough and fever since yesterday. Patient took at home covid test yesterday, was negative.  Please advise

## 2022-11-10 ENCOUNTER — TELEMEDICINE (OUTPATIENT)
Dept: FAMILY MEDICINE CLINIC | Facility: CLINIC | Age: 47
End: 2022-11-10
Payer: COMMERCIAL

## 2022-11-10 DIAGNOSIS — R50.9 FEVER, UNSPECIFIED FEVER CAUSE: Primary | ICD-10-CM

## 2022-11-10 PROCEDURE — 99213 OFFICE O/P EST LOW 20 MIN: CPT | Performed by: FAMILY MEDICINE

## 2022-11-10 RX ORDER — AZITHROMYCIN 250 MG/1
TABLET, FILM COATED ORAL
Qty: 6 TABLET | Refills: 0 | Status: SHIPPED | OUTPATIENT
Start: 2022-11-10 | End: 2022-11-15

## 2022-11-10 NOTE — PATIENT INSTRUCTIONS
Thank you for choosing Hira Lyle [facility] at [facility]  To Do: Judy Simmsus Hicks  1. Please see info  [facility] is located in [address]. Monday, Tuesday & Friday - 8 a.m. to 4 p.m. Wednesday, Thursday - 7 a.m. to 3 p.m. The lab is closed daily from 12 p.m.-12:30 p.m. Saturday lab hours by appointment. Call [phone] to schedule the appointment. Please signup for [employer], which is electronic access to your record if you have not done so. All your results will post on there. [email or web address]   You can NOW use [employer] to book your appointments with us, or consider using open access scheduling which is through the [facility] website [email or web address] and type in Hira Lyle MD and follow the links for \"Schedule Online Now\"    To schedule Imaging or tests at [facility] Scheduling [phone], Go to [facility] A ER Building (For example: CT scans, X rays, Ultrasound, MRI)  Cardiac Testing in ER building Building A second floor Cardiac Testing [phone] (For example: Holter Monitor, Cardiac Stress tests,Event Monitor, or 2D Echocardiograms)  [facility] call [phone] usually in Bldg A  Walk in Clinic in [place] at [facility]. [address] Mon-Fri at 8am-7:30 p.m., and Sat/Sun 9:00a. m.-4:30 p.m. Also at [address] [facility]  Call [phone] for info     Please call our office about any questions regarding your treatment/medicines/tests as a result of today's visit. For your safety, read the entire package insert of all medicines prescribed to you and be aware of all of the risks of treatment even beyond those discussed today. All therapies have potential risk of harm or side effects or medication interactions.   It is your duty and for your safety to discuss with the pharmacist and our office with questions, and to notify us and stop treatment if problems arise, but know that our intention is that the benefits outweigh those potential risks and we strive to make you healthier and to improve your quality of life. Referrals, and Radiology Information:    If your insurance requires a referral to a specialist, please allow 5 business days to process your referral request.    If Kyle Edwards MD orders a CT or MRI, it may take up to 10 business days to receive approval from your insurance company. Once our office has called informing you that the insurance company approved your testing, please call Central Scheduling at [phone]  Please allow our office 5 business days to contact you regarding any testing results. Refill policies:   Allow 3 business days for refills; controlled substances may take longer and must be picked up from the office in person. Narcotic medications can only be filled in 30 day increments and must be refilled at an office visit only. If your prescription is due for a refill, you may be due for a follow-up appointment. We cannot refill your maintenance medications at a preventative wellness visit. To best provide you care, patients receiving maintenance medications need to be seen at least twice a year.

## 2022-11-10 NOTE — TELEPHONE ENCOUNTER
Pt has VV scheduled tonight for cough and fever. FYI pt has hx of cocaine use, last confirmed by lab testing 5/2022.      Future Appointments   Date Time Provider Kade Shana   11/10/2022  5:30 PM Saeed Callahan MD [facility] [address] [facility] [address]   11/22/2022 10:00 AM Chanell Hanson MD [facility]

## 2022-11-11 ENCOUNTER — LAB ENCOUNTER (OUTPATIENT)
Dept: LAB | Age: 47
End: 2022-11-11
Attending: FAMILY MEDICINE
Payer: COMMERCIAL

## 2022-11-11 DIAGNOSIS — R50.9 FEVER, UNSPECIFIED FEVER CAUSE: ICD-10-CM

## 2022-11-11 PROCEDURE — 87637 SARSCOV2&INF A&B&RSV AMP PRB: CPT

## 2022-11-12 LAB
FLUAV + FLUBV RNA SPEC NAA+PROBE: NOT DETECTED
FLUAV + FLUBV RNA SPEC NAA+PROBE: NOT DETECTED
RSV RNA SPEC NAA+PROBE: NOT DETECTED
SARS-COV-2 RNA RESP QL NAA+PROBE: DETECTED

## 2023-02-09 ENCOUNTER — TELEPHONE (OUTPATIENT)
Dept: FAMILY MEDICINE CLINIC | Facility: CLINIC | Age: 48
End: 2023-02-09

## 2023-02-14 ENCOUNTER — OFFICE VISIT (OUTPATIENT)
Dept: FAMILY MEDICINE CLINIC | Facility: CLINIC | Age: 48
End: 2023-02-14
Payer: COMMERCIAL

## 2023-02-14 VITALS
TEMPERATURE: 98 F | OXYGEN SATURATION: 100 % | SYSTOLIC BLOOD PRESSURE: 128 MMHG | HEART RATE: 85 BPM | HEIGHT: 70 IN | BODY MASS INDEX: 28.06 KG/M2 | DIASTOLIC BLOOD PRESSURE: 80 MMHG | RESPIRATION RATE: 18 BRPM | WEIGHT: 196 LBS

## 2023-02-14 DIAGNOSIS — Z00.00 LABORATORY EXAM ORDERED AS PART OF ROUTINE GENERAL MEDICAL EXAMINATION: ICD-10-CM

## 2023-02-14 DIAGNOSIS — Z28.21 IMMUNIZATION DECLINED: ICD-10-CM

## 2023-02-14 DIAGNOSIS — M54.50 CHRONIC MIDLINE LOW BACK PAIN WITHOUT SCIATICA: ICD-10-CM

## 2023-02-14 DIAGNOSIS — F17.210 CIGARETTE NICOTINE DEPENDENCE WITHOUT COMPLICATION: ICD-10-CM

## 2023-02-14 DIAGNOSIS — R07.89 OTHER CHEST PAIN: ICD-10-CM

## 2023-02-14 DIAGNOSIS — G89.29 CHRONIC MIDLINE LOW BACK PAIN WITHOUT SCIATICA: ICD-10-CM

## 2023-02-14 DIAGNOSIS — E78.2 MIXED HYPERLIPIDEMIA DUE TO TYPE 2 DIABETES MELLITUS (HCC): ICD-10-CM

## 2023-02-14 DIAGNOSIS — Z00.00 WELLNESS EXAMINATION: Primary | ICD-10-CM

## 2023-02-14 DIAGNOSIS — E11.65 TYPE 2 DIABETES MELLITUS WITH HYPERGLYCEMIA, WITHOUT LONG-TERM CURRENT USE OF INSULIN (HCC): ICD-10-CM

## 2023-02-14 DIAGNOSIS — E11.69 MIXED HYPERLIPIDEMIA DUE TO TYPE 2 DIABETES MELLITUS (HCC): ICD-10-CM

## 2023-02-14 DIAGNOSIS — G62.9 NEUROPATHY: ICD-10-CM

## 2023-02-14 LAB
CREAT UR-SCNC: 54.1 MG/DL
MICROALBUMIN UR-MCNC: 1.14 MG/DL
MICROALBUMIN/CREAT 24H UR-RTO: 21.1 UG/MG (ref ?–30)

## 2023-02-14 PROCEDURE — 3008F BODY MASS INDEX DOCD: CPT | Performed by: FAMILY MEDICINE

## 2023-02-14 PROCEDURE — 3074F SYST BP LT 130 MM HG: CPT | Performed by: FAMILY MEDICINE

## 2023-02-14 PROCEDURE — 82043 UR ALBUMIN QUANTITATIVE: CPT | Performed by: FAMILY MEDICINE

## 2023-02-14 PROCEDURE — 99396 PREV VISIT EST AGE 40-64: CPT | Performed by: FAMILY MEDICINE

## 2023-02-14 PROCEDURE — 3079F DIAST BP 80-89 MM HG: CPT | Performed by: FAMILY MEDICINE

## 2023-02-14 PROCEDURE — 99214 OFFICE O/P EST MOD 30 MIN: CPT | Performed by: FAMILY MEDICINE

## 2023-02-14 PROCEDURE — 82570 ASSAY OF URINE CREATININE: CPT | Performed by: FAMILY MEDICINE

## 2023-02-14 RX ORDER — GLIPIZIDE 10 MG/1
10 TABLET, FILM COATED, EXTENDED RELEASE ORAL DAILY
Qty: 90 TABLET | Refills: 0 | Status: SHIPPED | OUTPATIENT
Start: 2023-02-14

## 2023-02-14 RX ORDER — GABAPENTIN 100 MG/1
100 CAPSULE ORAL 3 TIMES DAILY
Qty: 270 CAPSULE | Refills: 0 | Status: SHIPPED | OUTPATIENT
Start: 2023-02-14

## 2023-02-14 RX ORDER — HYDROCODONE BITARTRATE AND ACETAMINOPHEN 5; 325 MG/1; MG/1
1 TABLET ORAL EVERY 6 HOURS PRN
Qty: 20 TABLET | Refills: 0 | Status: SHIPPED | OUTPATIENT
Start: 2023-02-14

## 2023-02-14 NOTE — PATIENT INSTRUCTIONS
Go for your fasting blood tests. Do not eat or drink except for water for at least 8 hours prior to the blood tests. Schedule your Stress test.    Keep your appointments for your xray, MRI, EGD and colonoscopy as scheduled. I have increased your gabapentin to 100 mg three times a day to see if it helps with the burning sensation in your feet. Consider getting the pneumonia vaccine booster at your next visit. Go for your diabetic eye exam and please have your doctor send me your report. I will contact you with your test results once available. We will probably need to adjust your medications for diabetes.

## 2023-02-17 ENCOUNTER — LAB ENCOUNTER (OUTPATIENT)
Dept: LAB | Age: 48
End: 2023-02-17
Attending: FAMILY MEDICINE
Payer: COMMERCIAL

## 2023-02-17 DIAGNOSIS — Z00.00 LABORATORY EXAM ORDERED AS PART OF ROUTINE GENERAL MEDICAL EXAMINATION: ICD-10-CM

## 2023-02-17 DIAGNOSIS — E11.65 TYPE 2 DIABETES MELLITUS WITH HYPERGLYCEMIA, WITHOUT LONG-TERM CURRENT USE OF INSULIN (HCC): ICD-10-CM

## 2023-02-17 LAB
ALBUMIN SERPL-MCNC: 3.9 G/DL (ref 3.4–5)
ALBUMIN/GLOB SERPL: 1 {RATIO} (ref 1–2)
ALP LIVER SERPL-CCNC: 101 U/L
ALT SERPL-CCNC: 69 U/L
ANION GAP SERPL CALC-SCNC: 7 MMOL/L (ref 0–18)
AST SERPL-CCNC: 23 U/L (ref 15–37)
BASOPHILS # BLD AUTO: 0.13 X10(3) UL (ref 0–0.2)
BASOPHILS NFR BLD AUTO: 1.3 %
BILIRUB SERPL-MCNC: 0.5 MG/DL (ref 0.1–2)
BUN BLD-MCNC: 16 MG/DL (ref 7–18)
CALCIUM BLD-MCNC: 9.8 MG/DL (ref 8.5–10.1)
CHLORIDE SERPL-SCNC: 103 MMOL/L (ref 98–112)
CHOLEST SERPL-MCNC: 251 MG/DL (ref ?–200)
CO2 SERPL-SCNC: 24 MMOL/L (ref 21–32)
CREAT BLD-MCNC: 0.93 MG/DL
EOSINOPHIL # BLD AUTO: 0.5 X10(3) UL (ref 0–0.7)
EOSINOPHIL NFR BLD AUTO: 5.1 %
ERYTHROCYTE [DISTWIDTH] IN BLOOD BY AUTOMATED COUNT: 12.1 %
EST. AVERAGE GLUCOSE BLD GHB EST-MCNC: 283 MG/DL (ref 68–126)
FASTING PATIENT LIPID ANSWER: YES
FASTING STATUS PATIENT QL REPORTED: YES
GFR SERPLBLD BASED ON 1.73 SQ M-ARVRAT: 102 ML/MIN/1.73M2 (ref 60–?)
GLOBULIN PLAS-MCNC: 3.9 G/DL (ref 2.8–4.4)
GLUCOSE BLD-MCNC: 269 MG/DL (ref 70–99)
HBA1C MFR BLD: 11.5 % (ref ?–5.7)
HCT VFR BLD AUTO: 47.9 %
HCV AB SERPL QL IA: NONREACTIVE
HDLC SERPL-MCNC: 56 MG/DL (ref 40–59)
HGB BLD-MCNC: 16.8 G/DL
IMM GRANULOCYTES # BLD AUTO: 0.08 X10(3) UL (ref 0–1)
IMM GRANULOCYTES NFR BLD: 0.8 %
LDLC SERPL CALC-MCNC: 156 MG/DL (ref ?–100)
LYMPHOCYTES # BLD AUTO: 2.57 X10(3) UL (ref 1–4)
LYMPHOCYTES NFR BLD AUTO: 26.1 %
MCH RBC QN AUTO: 31.5 PG (ref 26–34)
MCHC RBC AUTO-ENTMCNC: 35.1 G/DL (ref 31–37)
MCV RBC AUTO: 89.9 FL
MONOCYTES # BLD AUTO: 0.67 X10(3) UL (ref 0.1–1)
MONOCYTES NFR BLD AUTO: 6.8 %
NEUTROPHILS # BLD AUTO: 5.91 X10 (3) UL (ref 1.5–7.7)
NEUTROPHILS # BLD AUTO: 5.91 X10(3) UL (ref 1.5–7.7)
NEUTROPHILS NFR BLD AUTO: 59.9 %
NONHDLC SERPL-MCNC: 195 MG/DL (ref ?–130)
OSMOLALITY SERPL CALC.SUM OF ELEC: 289 MOSM/KG (ref 275–295)
PLATELET # BLD AUTO: 228 10(3)UL (ref 150–450)
POTASSIUM SERPL-SCNC: 3.8 MMOL/L (ref 3.5–5.1)
PROT SERPL-MCNC: 7.8 G/DL (ref 6.4–8.2)
RBC # BLD AUTO: 5.33 X10(6)UL
SODIUM SERPL-SCNC: 134 MMOL/L (ref 136–145)
TRIGL SERPL-MCNC: 213 MG/DL (ref 30–149)
TSI SER-ACNC: 0.5 MIU/ML (ref 0.36–3.74)
VLDLC SERPL CALC-MCNC: 41 MG/DL (ref 0–30)
WBC # BLD AUTO: 9.9 X10(3) UL (ref 4–11)

## 2023-02-17 PROCEDURE — 86803 HEPATITIS C AB TEST: CPT

## 2023-02-17 PROCEDURE — 85025 COMPLETE CBC W/AUTO DIFF WBC: CPT

## 2023-02-17 PROCEDURE — 84443 ASSAY THYROID STIM HORMONE: CPT

## 2023-02-17 PROCEDURE — 36415 COLL VENOUS BLD VENIPUNCTURE: CPT

## 2023-02-17 PROCEDURE — 83036 HEMOGLOBIN GLYCOSYLATED A1C: CPT

## 2023-02-17 PROCEDURE — 80053 COMPREHEN METABOLIC PANEL: CPT

## 2023-02-17 PROCEDURE — 80061 LIPID PANEL: CPT

## 2023-02-19 ENCOUNTER — HOSPITAL ENCOUNTER (OUTPATIENT)
Dept: GENERAL RADIOLOGY | Age: 48
End: 2023-02-19
Attending: STUDENT IN AN ORGANIZED HEALTH CARE EDUCATION/TRAINING PROGRAM
Payer: COMMERCIAL

## 2023-02-19 ENCOUNTER — HOSPITAL ENCOUNTER (OUTPATIENT)
Dept: GENERAL RADIOLOGY | Age: 48
Discharge: HOME | End: 2023-02-19
Attending: STUDENT IN AN ORGANIZED HEALTH CARE EDUCATION/TRAINING PROGRAM
Payer: COMMERCIAL

## 2023-02-19 DIAGNOSIS — R10.13 EPIGASTRIC PAIN: ICD-10-CM

## 2023-02-19 PROCEDURE — 74019 RADEX ABDOMEN 2 VIEWS: CPT | Performed by: STUDENT IN AN ORGANIZED HEALTH CARE EDUCATION/TRAINING PROGRAM

## 2023-02-27 ENCOUNTER — HOSPITAL ENCOUNTER (OUTPATIENT)
Dept: CT IMAGING | Age: 48
Discharge: HOME | End: 2023-02-27
Attending: FAMILY MEDICINE
Payer: COMMERCIAL

## 2023-02-27 DIAGNOSIS — R10.31 RLQ ABDOMINAL PAIN: ICD-10-CM

## 2023-02-27 PROCEDURE — 74177 CT ABD & PELVIS W/CONTRAST: CPT | Performed by: FAMILY MEDICINE

## 2023-03-06 ENCOUNTER — LAB ENCOUNTER (OUTPATIENT)
Dept: LAB | Age: 48
End: 2023-03-06
Attending: STUDENT IN AN ORGANIZED HEALTH CARE EDUCATION/TRAINING PROGRAM
Payer: COMMERCIAL

## 2023-03-06 DIAGNOSIS — Z01.818 PRE-OP TESTING: ICD-10-CM

## 2023-03-07 LAB — SARS-COV-2 RNA RESP QL NAA+PROBE: NOT DETECTED

## 2023-03-16 ENCOUNTER — TELEPHONE (OUTPATIENT)
Dept: FAMILY MEDICINE CLINIC | Facility: CLINIC | Age: 48
End: 2023-03-16

## 2023-04-16 DIAGNOSIS — E11.65 TYPE 2 DIABETES MELLITUS WITH HYPERGLYCEMIA, WITHOUT LONG-TERM CURRENT USE OF INSULIN (HCC): ICD-10-CM

## 2023-04-17 RX ORDER — GLIPIZIDE 10 MG/1
10 TABLET, FILM COATED, EXTENDED RELEASE ORAL DAILY
Qty: 90 TABLET | Refills: 0 | OUTPATIENT
Start: 2023-04-17

## 2023-07-12 ENCOUNTER — TELEPHONE (OUTPATIENT)
Dept: FAMILY MEDICINE CLINIC | Facility: CLINIC | Age: 48
End: 2023-07-12

## 2023-07-12 DIAGNOSIS — E78.2 MIXED HYPERLIPIDEMIA DUE TO TYPE 2 DIABETES MELLITUS: Primary | ICD-10-CM

## 2023-07-12 DIAGNOSIS — E11.69 MIXED HYPERLIPIDEMIA DUE TO TYPE 2 DIABETES MELLITUS: Primary | ICD-10-CM

## 2023-07-12 DIAGNOSIS — E11.65 TYPE 2 DIABETES MELLITUS WITH HYPERGLYCEMIA, WITHOUT LONG-TERM CURRENT USE OF INSULIN (HCC): ICD-10-CM

## 2023-07-13 RX ORDER — GLIPIZIDE 10 MG/1
10 TABLET, FILM COATED, EXTENDED RELEASE ORAL DAILY
Qty: 30 TABLET | Refills: 0 | Status: SHIPPED | OUTPATIENT
Start: 2023-07-13

## 2023-07-13 NOTE — TELEPHONE ENCOUNTER
LVM for pt to schedule f/up appt with Dr. Janae Ivey and to complete labs prior to appt. Pt advised only 30 day prescription was sent. Provided central scheduling [phone] to schedule labs and office number to schedule appt [phone]. Routing to triage to enter repeat labs.

## 2023-07-13 NOTE — TELEPHONE ENCOUNTER
Please contact the patient and schedule a follow up appointment on his diabetes. I sent a 30 day supply of his medication to his pharmacy. He needs repeat labs and to see me.

## 2023-08-24 ENCOUNTER — OFFICE VISIT (OUTPATIENT)
Dept: FAMILY MEDICINE CLINIC | Facility: CLINIC | Age: 48
End: 2023-08-24
Payer: COMMERCIAL

## 2023-08-24 VITALS
WEIGHT: 193.38 LBS | BODY MASS INDEX: 27.69 KG/M2 | SYSTOLIC BLOOD PRESSURE: 142 MMHG | OXYGEN SATURATION: 98 % | DIASTOLIC BLOOD PRESSURE: 88 MMHG | HEART RATE: 86 BPM | HEIGHT: 70 IN | TEMPERATURE: 98 F | RESPIRATION RATE: 16 BRPM

## 2023-08-24 DIAGNOSIS — M54.50 CHRONIC MIDLINE LOW BACK PAIN WITHOUT SCIATICA: ICD-10-CM

## 2023-08-24 DIAGNOSIS — G89.29 CHRONIC MIDLINE LOW BACK PAIN WITHOUT SCIATICA: ICD-10-CM

## 2023-08-24 DIAGNOSIS — E11.65 TYPE 2 DIABETES MELLITUS WITH HYPERGLYCEMIA, WITHOUT LONG-TERM CURRENT USE OF INSULIN (HCC): ICD-10-CM

## 2023-08-24 LAB
CARTRIDGE LOT#: ABNORMAL NUMERIC
HEMOGLOBIN A1C: 11.2 % (ref 4.3–5.6)

## 2023-08-24 PROCEDURE — 3079F DIAST BP 80-89 MM HG: CPT | Performed by: STUDENT IN AN ORGANIZED HEALTH CARE EDUCATION/TRAINING PROGRAM

## 2023-08-24 PROCEDURE — 3077F SYST BP >= 140 MM HG: CPT | Performed by: STUDENT IN AN ORGANIZED HEALTH CARE EDUCATION/TRAINING PROGRAM

## 2023-08-24 PROCEDURE — 3046F HEMOGLOBIN A1C LEVEL >9.0%: CPT | Performed by: STUDENT IN AN ORGANIZED HEALTH CARE EDUCATION/TRAINING PROGRAM

## 2023-08-24 PROCEDURE — 99214 OFFICE O/P EST MOD 30 MIN: CPT | Performed by: STUDENT IN AN ORGANIZED HEALTH CARE EDUCATION/TRAINING PROGRAM

## 2023-08-24 PROCEDURE — 83036 HEMOGLOBIN GLYCOSYLATED A1C: CPT | Performed by: STUDENT IN AN ORGANIZED HEALTH CARE EDUCATION/TRAINING PROGRAM

## 2023-08-24 PROCEDURE — 3008F BODY MASS INDEX DOCD: CPT | Performed by: STUDENT IN AN ORGANIZED HEALTH CARE EDUCATION/TRAINING PROGRAM

## 2023-08-24 RX ORDER — GLIPIZIDE 10 MG/1
10 TABLET, FILM COATED, EXTENDED RELEASE ORAL DAILY
Qty: 90 TABLET | Refills: 0 | Status: SHIPPED | OUTPATIENT
Start: 2023-08-24

## 2023-08-24 RX ORDER — HYDROCODONE BITARTRATE AND ACETAMINOPHEN 5; 325 MG/1; MG/1
1 TABLET ORAL EVERY 6 HOURS PRN
Qty: 20 TABLET | Refills: 0 | Status: SHIPPED | OUTPATIENT
Start: 2023-08-24

## 2023-08-24 RX ORDER — METFORMIN HYDROCHLORIDE 500 MG/1
TABLET, EXTENDED RELEASE ORAL
Qty: 346 TABLET | Refills: 0 | Status: SHIPPED | OUTPATIENT
Start: 2023-08-24 | End: 2023-11-22

## 2023-10-17 ENCOUNTER — TELEPHONE (OUTPATIENT)
Dept: FAMILY MEDICINE CLINIC | Facility: CLINIC | Age: 48
End: 2023-10-17

## 2023-10-17 NOTE — TELEPHONE ENCOUNTER
Please remove patient from  care gap. Patient established with another provider see encounter dated 8/24/23.

## 2023-11-21 ENCOUNTER — PATIENT OUTREACH (OUTPATIENT)
Dept: CASE MANAGEMENT | Age: 48
End: 2023-11-21

## 2023-11-21 NOTE — PROCEDURES
The office order for PCP removal request is Approved and finalized on November 21, 2023.     [facility] Attributed PCP is Dr. Samantha Dorado    Thanks,  [phone] [facility] Team

## 2023-11-29 DIAGNOSIS — E11.65 TYPE 2 DIABETES MELLITUS WITH HYPERGLYCEMIA, WITHOUT LONG-TERM CURRENT USE OF INSULIN (HCC): ICD-10-CM

## 2023-11-30 ENCOUNTER — TELEPHONE (OUTPATIENT)
Dept: FAMILY MEDICINE CLINIC | Facility: CLINIC | Age: 48
End: 2023-11-30

## 2023-11-30 DIAGNOSIS — E11.65 TYPE 2 DIABETES MELLITUS WITH HYPERGLYCEMIA, WITHOUT LONG-TERM CURRENT USE OF INSULIN (HCC): ICD-10-CM

## 2023-11-30 RX ORDER — GLIPIZIDE 10 MG/1
10 TABLET, FILM COATED, EXTENDED RELEASE ORAL DAILY
Qty: 90 TABLET | Refills: 0 | Status: SHIPPED | OUTPATIENT
Start: 2023-11-30

## 2023-11-30 RX ORDER — GLIPIZIDE 10 MG/1
10 TABLET, EXTENDED RELEASE ORAL DAILY
Qty: 90 TABLET | Refills: 0 | Status: SHIPPED | OUTPATIENT
Start: 2023-11-30 | End: 2023-11-30

## 2023-11-30 NOTE — TELEPHONE ENCOUNTER
#90 sent with note that states \"#30 only, needs appt\". Resent Rx with #90 and deleted note to pharmacy.

## 2023-12-07 ENCOUNTER — OFFICE VISIT (OUTPATIENT)
Dept: FAMILY MEDICINE CLINIC | Facility: CLINIC | Age: 48
End: 2023-12-07
Payer: COMMERCIAL

## 2023-12-07 VITALS
HEIGHT: 70 IN | SYSTOLIC BLOOD PRESSURE: 142 MMHG | DIASTOLIC BLOOD PRESSURE: 98 MMHG | HEART RATE: 85 BPM | TEMPERATURE: 97 F | BODY MASS INDEX: 27.35 KG/M2 | OXYGEN SATURATION: 96 % | WEIGHT: 191 LBS | RESPIRATION RATE: 16 BRPM

## 2023-12-07 DIAGNOSIS — E11.42 DIABETIC POLYNEUROPATHY ASSOCIATED WITH TYPE 2 DIABETES MELLITUS (HCC): ICD-10-CM

## 2023-12-07 DIAGNOSIS — E11.65 TYPE 2 DIABETES MELLITUS WITH HYPERGLYCEMIA, WITHOUT LONG-TERM CURRENT USE OF INSULIN (HCC): Primary | ICD-10-CM

## 2023-12-07 DIAGNOSIS — I10 PRIMARY HYPERTENSION: ICD-10-CM

## 2023-12-07 DIAGNOSIS — Z91.199 NONCOMPLIANCE: ICD-10-CM

## 2023-12-07 LAB
CARTRIDGE LOT#: ABNORMAL NUMERIC
HEMOGLOBIN A1C: 11.1 % (ref 4.3–5.6)

## 2023-12-07 RX ORDER — DIPHENHYDRAMINE HYDROCHLORIDE 25 MG/1
CAPSULE, LIQUID FILLED ORAL
Qty: 1 KIT | Refills: 0 | Status: SHIPPED | OUTPATIENT
Start: 2023-12-07

## 2024-01-03 ENCOUNTER — TELEPHONE (OUTPATIENT)
Dept: FAMILY MEDICINE CLINIC | Facility: CLINIC | Age: 49
End: 2024-01-03

## 2024-01-03 DIAGNOSIS — G89.29 CHRONIC MIDLINE LOW BACK PAIN WITHOUT SCIATICA: ICD-10-CM

## 2024-01-03 DIAGNOSIS — M54.50 CHRONIC MIDLINE LOW BACK PAIN WITHOUT SCIATICA: ICD-10-CM

## 2024-01-03 RX ORDER — GLUCOSAMINE HCL/CHONDROITIN SU 500-400 MG
1 CAPSULE ORAL DAILY
Qty: 100 STRIP | Refills: 0 | Status: SHIPPED | OUTPATIENT
Start: 2024-01-03

## 2024-01-03 RX ORDER — LANCETS 33 GAUGE
1 EACH MISCELLANEOUS DAILY
Qty: 100 EACH | Refills: 0 | Status: SHIPPED | OUTPATIENT
Start: 2024-01-03

## 2024-01-03 NOTE — TELEPHONE ENCOUNTER
Spouse calling, requesting testing supplies for glucometer. Patient was prescribed glucometer but not given supplies.

## 2024-01-04 RX ORDER — HYDROCODONE BITARTRATE AND ACETAMINOPHEN 5; 325 MG/1; MG/1
1 TABLET ORAL EVERY 6 HOURS PRN
Qty: 20 TABLET | Refills: 0 | OUTPATIENT
Start: 2024-01-04

## 2024-01-04 NOTE — TELEPHONE ENCOUNTER
See OV note 8/24/23. He was recommended to see pain management. No further narcotic prescriptions to be filled

## 2024-01-04 NOTE — TELEPHONE ENCOUNTER
Requesting Norco 5/325mg  Last OV: 12/7/23  RTC: 1 month  Last Rx'd 8/24/23 #20 with 0 refills    No future appointments.    Per [place] , last dispensed 8/24/23 #20    Non-protocol med:  Rx pended and routed for approval/denial

## 2024-02-05 ENCOUNTER — PATIENT MESSAGE (OUTPATIENT)
Dept: FAMILY MEDICINE CLINIC | Facility: CLINIC | Age: 49
End: 2024-02-05

## 2024-02-05 ENCOUNTER — OFFICE VISIT (OUTPATIENT)
Dept: FAMILY MEDICINE CLINIC | Facility: CLINIC | Age: 49
End: 2024-02-05
Payer: COMMERCIAL

## 2024-02-05 VITALS
DIASTOLIC BLOOD PRESSURE: 80 MMHG | HEIGHT: 70 IN | BODY MASS INDEX: 25.8 KG/M2 | SYSTOLIC BLOOD PRESSURE: 132 MMHG | TEMPERATURE: 97 F | HEART RATE: 81 BPM | RESPIRATION RATE: 16 BRPM | WEIGHT: 180.25 LBS | OXYGEN SATURATION: 97 %

## 2024-02-05 DIAGNOSIS — I10 PRIMARY HYPERTENSION: ICD-10-CM

## 2024-02-05 DIAGNOSIS — E11.42 DIABETIC POLYNEUROPATHY ASSOCIATED WITH TYPE 2 DIABETES MELLITUS (HCC): ICD-10-CM

## 2024-02-05 DIAGNOSIS — Z79.4 TYPE 2 DIABETES MELLITUS WITH HYPERGLYCEMIA, WITH LONG-TERM CURRENT USE OF INSULIN (HCC): Primary | ICD-10-CM

## 2024-02-05 DIAGNOSIS — E11.65 TYPE 2 DIABETES MELLITUS WITH HYPERGLYCEMIA, WITH LONG-TERM CURRENT USE OF INSULIN (HCC): Primary | ICD-10-CM

## 2024-02-05 DIAGNOSIS — B37.41 CANDIDAL URETHRITIS: Primary | ICD-10-CM

## 2024-02-05 LAB — POC GLUCOSE: 376

## 2024-02-05 PROCEDURE — 3075F SYST BP GE 130 - 139MM HG: CPT | Performed by: STUDENT IN AN ORGANIZED HEALTH CARE EDUCATION/TRAINING PROGRAM

## 2024-02-05 PROCEDURE — 99214 OFFICE O/P EST MOD 30 MIN: CPT | Performed by: STUDENT IN AN ORGANIZED HEALTH CARE EDUCATION/TRAINING PROGRAM

## 2024-02-05 PROCEDURE — 3079F DIAST BP 80-89 MM HG: CPT | Performed by: STUDENT IN AN ORGANIZED HEALTH CARE EDUCATION/TRAINING PROGRAM

## 2024-02-05 PROCEDURE — 3008F BODY MASS INDEX DOCD: CPT | Performed by: STUDENT IN AN ORGANIZED HEALTH CARE EDUCATION/TRAINING PROGRAM

## 2024-02-05 RX ORDER — GABAPENTIN 300 MG/1
300 CAPSULE ORAL 3 TIMES DAILY
Qty: 90 CAPSULE | Refills: 0 | Status: SHIPPED | OUTPATIENT
Start: 2024-02-05 | End: 2024-03-06

## 2024-02-05 RX ORDER — FLUCONAZOLE 200 MG/1
200 TABLET ORAL DAILY
Qty: 14 TABLET | Refills: 0 | Status: SHIPPED | OUTPATIENT
Start: 2024-02-05 | End: 2024-02-19

## 2024-02-05 NOTE — TELEPHONE ENCOUNTER
From: Cristiano Hicks  To: Mirella Green  Sent: 2/5/2024 3:55 PM CST  Subject: Yeast infection from jardiance    Hi Dr. Green,    One thing that we did not finish discussing was the yeast infection from the jardiance. I am not going to take it as you stated but do you need to give me something to clear it up? Please let me know. Thank you.

## 2024-02-05 NOTE — PROGRESS NOTES
Subjective:      Chief Complaint   Patient presents with    Side Effect     Jardiance -> states he got yeast infections and joint pains - Metformin -> states he periodically doesn't feel well.    Foot Pain     Bilateral foot pain - he thinks it might be gout or diabetic foot pain but would like to r/o those     HISTORY OF PRESENT ILLNESS  Foot Pain   Associated symptoms include numbness.     HPI obtained per patient report.  Cristiano Hicks is a pleasant 48 year old male presenting for follow-up for diabetes. He is accompanied by his wife today.   He reports that he developed urethral redness, pain, and discharge associated with jardiance so he self-discontinued this medication.   He reports numbness and pain in B/L distal feet to the balls of both feet.  He reports fatigue, intermittent nausea, and frequent urination for at least 1 month.  He has not been checking his BG levels at home.     PAST PATIENT HISTORY  Past Medical History:   Diagnosis Date    Back pain     Decorative tattoo     Diabetes (HCC)     High blood pressure     Hyperlipidemia     Pain in joints     Wears glasses      Past Surgical History:   Procedure Laterality Date    COLONOSCOPY      COLONOSCOPY N/A 02/08/2018    Procedure: COLONOSCOPY, POSSIBLE BIOPSY, POSSIBLE POLYPECTOMY 57452;  Surgeon: Donavan Varela MD;  Location: [facility] [place] [facility]    COLOSTOMY      Due to diverticulitis    HERNIA SURGERY      OTHER SURGICAL HISTORY      SKIN SURGERY      VASECTOMY  1997       CURRENT MEDICATIONS  Outpatient Medications Marked as Taking for the 2/5/24 encounter (Office Visit) with Mirella Green MD   Medication Sig Dispense Refill    Continuous Blood Gluc  (FREESTYLE ED 2 READER) Does not apply Device 1 each one time for 1 dose. 1 each 0    Continuous Blood Gluc Sensor (FREESTYLE ED 2 SENSOR) Does not apply Misc 1 Dose every 14 (fourteen) days. 1 each 0    gabapentin 300 MG Oral Cap Take 1 capsule (300 mg total) by mouth 3 (three)  times daily. 90 capsule 0    Insulin Glargine, 1 Unit Dial, 300 UNIT/ML Subcutaneous Solution Pen-injector Inject 10 Units into the skin every evening. 1.5 mL 0    Glucose Blood (BLOOD GLUCOSE TEST) In Vitro Strip 1 strip by Finger stick route daily. 100 strip 0    Lancets 33G Does not apply Misc 1 Lancet by Finger stick route daily. 100 each 0    Blood Glucose Monitoring Suppl (BLOOD GLUCOSE MONITOR SYSTEM) w/Device Does not apply Kit Check blood sugar daily 1 kit 0    pantoprazole 40 MG Oral Tab EC Take one tablet (40 mg total) by mouth once daily, 30 minutes prior to breakfast. 90 tablet 3    naproxen 500 MG Oral Tab Take 1 tablet (500 mg total) by mouth 2 (two) times daily as needed (pain). Take with food. 60 tablet 1    Naloxone HCl 4 MG/0.1ML Nasal Liquid 4 mg by Nasal route as needed. If patient remains unresponsive, repeat dose in other nostril 2-5 minutes after first dose. 1 kit 0    ONETOUCH DELICA LANCETS 33G Does not apply Misc TEST DAILY AS NEEDED 100 each 5    Glucose Blood (ONETOUCH ULTRA BLUE) In Vitro Strip TEST DAILY AS NEEDED 100 strip 5    Glucose Blood (FREESTYLE TEST) In Vitro Strip Check sugar once daily as needed 100 strip 11    FREESTYLE SYSTEM Does not apply Kit 1 each by Does not apply route daily as needed for Other. 1 each 0       HEALTH MAINTENANCE  Immunization History   Administered Date(s) Administered    FLU VAC QIV SPLIT 3 YRS AND OLDER (73429) 11/04/2019, 01/04/2021    Pneumovax 23 05/04/2018    TDAP 07/21/2015       ALLERGIES AND DRUG REACTIONS  Allergies   Allergen Reactions    Tramadol NAUSEA AND VOMITING    Metformin DIARRHEA       Family History   Problem Relation Age of Onset    Diabetes Father     Hypertension Mother     No Known Problems Sister     Colon Cancer Neg     Prostate Cancer Neg      Social History     Socioeconomic History    Marital status:    Occupational History    Occupation: Sales      Employer: [employer]   Tobacco Use    Smoking status: Every  Day     Packs/day: 1.00     Years: 20.00     Additional pack years: 0.00     Total pack years: 20.00     Types: Cigarettes    Smokeless tobacco: Never    Tobacco comments:     Patient not interested in counseling at this time   Vaping Use    Vaping Use: Never used   Substance and Sexual Activity    Alcohol use: Yes     Alcohol/week: 12.0 standard drinks of alcohol     Types: 12 Cans of beer per week    Drug use: Not Currently     Types: Cannabis    Sexual activity: Yes     Partners: Female   Other Topics Concern    Caffeine Concern No    Exercise No    Seat Belt No    Special Diet No    Stress Concern No    Weight Concern No       Review of Systems   Neurological:  Positive for numbness.   All other systems reviewed and are negative.         Objective:      /80   Pulse 81   Temp 97.3 °F (36.3 °C) (Temporal)   Resp 16   Ht 5' 10\" (1.778 m)   Wt 180 lb 4 oz (81.8 kg)   SpO2 97%   BMI 25.86 kg/m²   Body mass index is 25.86 kg/m².    Physical Exam  Vitals reviewed.   Constitutional:       General: He is not in acute distress.     Appearance: He is not ill-appearing, toxic-appearing or diaphoretic.   HENT:      Head: Normocephalic and atraumatic.   Eyes:      General: No scleral icterus.        Right eye: No discharge.         Left eye: No discharge.      Conjunctiva/sclera: Conjunctivae normal.   Cardiovascular:      Rate and Rhythm: Normal rate.   Pulmonary:      Effort: Pulmonary effort is normal.   Abdominal:      General: Bowel sounds are normal. There is no distension.   Musculoskeletal:         General: Tenderness (tenderness to palpation of R 5th toe) present. No swelling or deformity. Normal range of motion.      Cervical back: Neck supple.      Right lower leg: No edema.      Left lower leg: No edema.   Skin:     General: Skin is warm and dry.      Coloration: Skin is not jaundiced or pale.      Findings: No bruising, erythema or rash.   Neurological:      Mental Status: He is alert and oriented to  person, place, and time.            Assessment and Plan:      1. Type 2 diabetes mellitus with hyperglycemia, with long-term current use of insulin (HCC) (Primary)  -     DIETITIAN EDUCATION INITIAL, DIET (INTERNAL)  -     FreeStyle Anali 2 [place]; 1 each one time for 1 dose.  Dispense: 1 each; Refill: 0  -     FreeStyle Anali 2 Sensor; 1 Dose every 14 (fourteen) days.  Dispense: 1 each; Refill: 0  -     Insulin Glargine (1 Unit Dial); Inject 10 Units into the skin every evening.  Dispense: 1.5 mL; Refill: 0  -     POCT Glucose  2. Diabetic polyneuropathy associated with type 2 diabetes mellitus (HCC)  -     Gabapentin; Take 1 capsule (300 mg total) by mouth 3 (three) times daily.  Dispense: 90 capsule; Refill: 0  3. Primary hypertension    Return in about 1 week (around 2/12/2024).    DM2  - uncontrolled  - complicated by diabetic neuropathy  - A1C 11.1 12/2023  - POC  today  - we discussed that his blood glucose level is very high and again discussed the risks of uncontrolled diabetes  - unable to tolerate jardiance, metformin, and metformin ER due to adverse effects  - recommended initiation of insulin as prescribed. He does not check his BG levels at home due to fear of needles but is amenable to CGM. He is recommended to check BG levels QID and follow-up in 1 week for insulin dose titration. We discussed that if his BG does not improve with insulin, he will need to be treated with IV insulin in the hospital  - we discussed eliminating his intake of sweets and adhering to a low carbohydrate diet, as well as exercising regularly  - dietician referral provided for further nutritional guidance  - will increase gabapentin dose for symptoms of diabetic peripheral neuropathy    HTN  - now within goal    Patient verbalized understanding of assessment and recommendations. All questions and concerns were addressed.    Electronically signed by Mirella Green MD

## 2024-02-19 ENCOUNTER — PATIENT MESSAGE (OUTPATIENT)
Dept: FAMILY MEDICINE CLINIC | Facility: CLINIC | Age: 49
End: 2024-02-19

## 2024-02-20 DIAGNOSIS — E11.65 TYPE 2 DIABETES MELLITUS WITH HYPERGLYCEMIA, WITH LONG-TERM CURRENT USE OF INSULIN (HCC): ICD-10-CM

## 2024-02-20 DIAGNOSIS — Z79.4 TYPE 2 DIABETES MELLITUS WITH HYPERGLYCEMIA, WITH LONG-TERM CURRENT USE OF INSULIN (HCC): ICD-10-CM

## 2024-02-20 NOTE — TELEPHONE ENCOUNTER
From: Cristiano Hicks  To: Angiejackie Norma  Sent: 2/19/2024 2:28 PM CST  Subject: Insulin Shots    I have been taking the shots since Wednesday night and there has been no change in my sugars it goes from high and I assuming 400 plus to 250 and above. I need to get my sugars where they need to be. I have not been drinking and have been watching my carbs and sugar intake. What should I do. Is there anything I can do

## 2024-02-20 NOTE — TELEPHONE ENCOUNTER
Future Appointments   Date Time Provider Department Center   2/22/2024 11:40 AM Mirella Green MD [facility] [address] [facility] [address]       [address]

## 2024-02-22 ENCOUNTER — OFFICE VISIT (OUTPATIENT)
Dept: FAMILY MEDICINE CLINIC | Facility: CLINIC | Age: 49
End: 2024-02-22
Payer: COMMERCIAL

## 2024-02-22 VITALS
WEIGHT: 183.5 LBS | RESPIRATION RATE: 16 BRPM | BODY MASS INDEX: 26.27 KG/M2 | TEMPERATURE: 98 F | DIASTOLIC BLOOD PRESSURE: 80 MMHG | SYSTOLIC BLOOD PRESSURE: 142 MMHG | HEIGHT: 70 IN | OXYGEN SATURATION: 97 % | HEART RATE: 85 BPM

## 2024-02-22 DIAGNOSIS — M79.671 RIGHT FOOT PAIN: ICD-10-CM

## 2024-02-22 DIAGNOSIS — E11.65 TYPE 2 DIABETES MELLITUS WITH HYPERGLYCEMIA, WITH LONG-TERM CURRENT USE OF INSULIN (HCC): Primary | ICD-10-CM

## 2024-02-22 DIAGNOSIS — Z12.11 COLON CANCER SCREENING: ICD-10-CM

## 2024-02-22 DIAGNOSIS — Z79.4 TYPE 2 DIABETES MELLITUS WITH HYPERGLYCEMIA, WITH LONG-TERM CURRENT USE OF INSULIN (HCC): Primary | ICD-10-CM

## 2024-02-22 PROCEDURE — 99214 OFFICE O/P EST MOD 30 MIN: CPT | Performed by: STUDENT IN AN ORGANIZED HEALTH CARE EDUCATION/TRAINING PROGRAM

## 2024-02-22 PROCEDURE — 3008F BODY MASS INDEX DOCD: CPT | Performed by: STUDENT IN AN ORGANIZED HEALTH CARE EDUCATION/TRAINING PROGRAM

## 2024-02-22 PROCEDURE — 3077F SYST BP >= 140 MM HG: CPT | Performed by: STUDENT IN AN ORGANIZED HEALTH CARE EDUCATION/TRAINING PROGRAM

## 2024-02-22 PROCEDURE — 3079F DIAST BP 80-89 MM HG: CPT | Performed by: STUDENT IN AN ORGANIZED HEALTH CARE EDUCATION/TRAINING PROGRAM

## 2024-02-22 RX ORDER — GABAPENTIN 600 MG/1
600 TABLET ORAL 3 TIMES DAILY
Qty: 90 TABLET | Refills: 0 | Status: SHIPPED | OUTPATIENT
Start: 2024-02-22 | End: 2024-03-23

## 2024-02-22 NOTE — TELEPHONE ENCOUNTER
RX sent electronically on 2/21/24, unsure if it went through due to pharmacy system down.   RX faxed to pharmacy.

## 2024-02-22 NOTE — PROGRESS NOTES
Subjective:      Chief Complaint   Patient presents with    Diabetes     F/up - was started on insulin recently - changed diet taking Insulin 10 units daily     HISTORY OF PRESENT ILLNESS  HPI  HPI obtained per patient report.  Cristiano Hicks is a pleasant 48 year old male presenting for follow-up for his diabetes.   He is accompanied by his wife today.  He has been using his insulin glargine as prescribed and has made dietary modifications.   His BG levels have been improving but are still up to the mid 300s.   He reports that his B/L foot numbness and pain distal to the balls of both feet have not improved with his increased dose of gabapentin. He notes that his R 5th toe pain is improved by abduction of the toe.     PAST PATIENT HISTORY  Past Medical History:   Diagnosis Date    Back pain     Decorative tattoo     Diabetes (HCC)     High blood pressure     Hyperlipidemia     Pain in joints     Wears glasses      Past Surgical History:   Procedure Laterality Date    COLONOSCOPY      COLONOSCOPY N/A 02/08/2018    Procedure: COLONOSCOPY, POSSIBLE BIOPSY, POSSIBLE POLYPECTOMY 92527;  Surgeon: Donavan Varela MD;  [facility]    COLOSTOMY      Due to diverticulitis    HERNIA SURGERY      OTHER SURGICAL HISTORY      SKIN SURGERY      VASECTOMY  1997       CURRENT MEDICATIONS  Outpatient Medications Marked as Taking for the 2/22/24 encounter (Office Visit) with Mirella Green MD   Medication Sig Dispense Refill    Continuous Blood Gluc Sensor (FREESTYLE ED 2 SENSOR) Does not apply Misc 1 each every 14 (fourteen) days. 1 each 0    Insulin Glargine, 1 Unit Dial, 300 UNIT/ML Subcutaneous Solution Pen-injector Inject 15 Units into the skin every evening. 1.5 mL 0    gabapentin 600 MG Oral Tab Take 1 tablet (600 mg total) by mouth 3 (three) times daily. 90 tablet 0    Continuous Blood Gluc Sensor (FREESTYLE ED 2 SENSOR) Does not apply Misc 1 each every 14 (fourteen) days. 4 each 1    Glucose Blood  (BLOOD GLUCOSE TEST) In Vitro Strip 1 strip by Finger stick route daily. 100 strip 0    Lancets 33G Does not apply Misc 1 Lancet by Finger stick route daily. 100 each 0    Blood Glucose Monitoring Suppl (BLOOD GLUCOSE MONITOR SYSTEM) w/Device Does not apply Kit Check blood sugar daily 1 kit 0    pantoprazole 40 MG Oral Tab EC Take one tablet (40 mg total) by mouth once daily, 30 minutes prior to breakfast. 90 tablet 3    naproxen 500 MG Oral Tab Take 1 tablet (500 mg total) by mouth 2 (two) times daily as needed (pain). Take with food. 60 tablet 1    Naloxone HCl 4 MG/0.1ML Nasal Liquid 4 mg by Nasal route as needed. If patient remains unresponsive, repeat dose in other nostril 2-5 minutes after first dose. 1 kit 0    ONETOUCH DELICA LANCETS 33G Does not apply Misc TEST DAILY AS NEEDED 100 each 5    Glucose Blood (ONETOUCH ULTRA BLUE) In Vitro Strip TEST DAILY AS NEEDED 100 strip 5    Glucose Blood (FREESTYLE TEST) In Vitro Strip Check sugar once daily as needed 100 strip 11    FREESTYLE SYSTEM Does not apply Kit 1 each by Does not apply route daily as needed for Other. 1 each 0       HEALTH MAINTENANCE  Immunization History   Administered Date(s) Administered    FLU VAC QIV SPLIT 3 YRS AND OLDER (84807) 11/04/2019, 01/04/2021    Pneumovax 23 05/04/2018    TDAP 07/21/2015       ALLERGIES AND DRUG REACTIONS  Allergies   Allergen Reactions    Tramadol NAUSEA AND VOMITING    Metformin DIARRHEA       Family History   Problem Relation Age of Onset    Diabetes Father     Hypertension Mother     No Known Problems Sister     Colon Cancer Neg     Prostate Cancer Neg      Social History     Socioeconomic History    Marital status:    Occupational History    Occupation: Sales      Employer: [employer]   Tobacco Use    Smoking status: Every Day     Packs/day: 1.00     Years: 20.00     Additional pack years: 0.00     Total pack years: 20.00     Types: Cigarettes    Smokeless tobacco: Never    Tobacco comments:      Patient not interested in counseling at this time   Vaping Use    Vaping Use: Never used   Substance and Sexual Activity    Alcohol use: Yes     Alcohol/week: 12.0 standard drinks of alcohol     Types: 12 Cans of beer per week    Drug use: Not Currently     Types: Cannabis    Sexual activity: Yes     Partners: Female   Other Topics Concern    Caffeine Concern No    Exercise No    Seat Belt No    Special Diet No    Stress Concern No    Weight Concern No       Review of Systems   Neurological:  Positive for numbness.   All other systems reviewed and are negative.         Objective:      /80   Pulse 85   Temp 97.9 °F (36.6 °C) (Temporal)   Resp 16   Ht 5' 10\" (1.778 m)   Wt 183 lb 8 oz (83.2 kg)   SpO2 97%   BMI 26.33 kg/m²   Body mass index is 26.33 kg/m².    Physical Exam  Vitals reviewed.   Constitutional:       General: He is not in acute distress.     Appearance: He is not ill-appearing, toxic-appearing or diaphoretic.   HENT:      Head: Normocephalic and atraumatic.   Eyes:      General: No scleral icterus.        Right eye: No discharge.         Left eye: No discharge.      Conjunctiva/sclera: Conjunctivae normal.   Cardiovascular:      Rate and Rhythm: Normal rate.   Pulmonary:      Effort: Pulmonary effort is normal.   Abdominal:      General: Bowel sounds are normal. There is no distension.      Palpations: Abdomen is soft. There is no mass.      Tenderness: There is no abdominal tenderness. There is no guarding or rebound.   Musculoskeletal:      Cervical back: Neck supple.      Right lower leg: No edema.      Left lower leg: No edema.   Skin:     General: Skin is warm and dry.      Coloration: Skin is not jaundiced or pale.      Findings: No bruising, erythema or rash.   Neurological:      Mental Status: He is alert and oriented to person, place, and time.            Assessment and Plan:      1. Type 2 diabetes mellitus with hyperglycemia, with long-term current use of insulin (HCC)  (Primary)  -     Insulin Glargine (1 Unit Dial); Inject 15 Units into the skin every evening.  Dispense: 1.5 mL; Refill: 0  -     Gabapentin; Take 1 tablet (600 mg total) by mouth 3 (three) times daily.  Dispense: 90 tablet; Refill: 0  -     TSH W Reflex To Free T4; Future; Expected date: 02/22/2024  -     Lipid Panel; Future; Expected date: 02/22/2024  -     Microalb/Creat Ratio, Random Urine; Future; Expected date: 02/22/2024  -     Comp Metabolic Panel (14); Future; Expected date: 02/22/2024  -     CBC, Platelet; No Differential; Future; Expected date: 02/22/2024  -     FreeStyle Anali 2 Sensor; 1 each every 14 (fourteen) days.  Dispense: 4 each; Refill: 1  2. Right foot pain  -     Gabapentin; Take 1 tablet (600 mg total) by mouth 3 (three) times daily.  Dispense: 90 tablet; Refill: 0  -     US EXTREMITY NONVASCULAR  (CPT=76882); Future; Expected date: 02/22/2024  3. Colon cancer screening  -     GASTRO - INTERNAL    Return in about 2 weeks (around 3/7/2024).    DM2  - complicated by diabetic neuropathy  - last A1C 11.1 12/2023  - BG's improving but still elevated  - recommended increasing insulin glargine dose to 15 units at bedtime. Recommended this dose for the next 3 days, then increasing the dose by 5 units every 3 days to achieve a goal of fasting BG <130. Continue QID BG monitoring with CGM  - unable to tolerate jardiance, metformin, and metformin ER due to adverse effects  - will increase gabapentin dose for symptoms of diabetic peripheral neuropathy. Will obtain R foot US to R/O neuroma given improvement of pain with R 5th toe abduction  - due for annual labs  - due for colonoscopy; GI referral provided  - follow-up in 2 weeks    Patient verbalized understanding of assessment and recommendations. All questions and concerns were addressed.    Electronically signed by Mirella Green MD

## 2024-02-24 ENCOUNTER — LAB ENCOUNTER (OUTPATIENT)
Dept: LAB | Age: 49
End: 2024-02-24
Attending: STUDENT IN AN ORGANIZED HEALTH CARE EDUCATION/TRAINING PROGRAM
Payer: COMMERCIAL

## 2024-02-24 DIAGNOSIS — E11.65 TYPE 2 DIABETES MELLITUS WITH HYPERGLYCEMIA, WITH LONG-TERM CURRENT USE OF INSULIN (HCC): ICD-10-CM

## 2024-02-24 DIAGNOSIS — Z79.4 TYPE 2 DIABETES MELLITUS WITH HYPERGLYCEMIA, WITH LONG-TERM CURRENT USE OF INSULIN (HCC): ICD-10-CM

## 2024-02-24 DIAGNOSIS — E11.65 TYPE 2 DIABETES MELLITUS WITH HYPERGLYCEMIA, WITHOUT LONG-TERM CURRENT USE OF INSULIN (HCC): ICD-10-CM

## 2024-02-24 LAB
ALBUMIN SERPL-MCNC: 3.7 G/DL (ref 3.4–5)
ALBUMIN/GLOB SERPL: 1 {RATIO} (ref 1–2)
ALP LIVER SERPL-CCNC: 116 U/L
ALT SERPL-CCNC: 38 U/L
ANION GAP SERPL CALC-SCNC: 7 MMOL/L (ref 0–18)
AST SERPL-CCNC: 20 U/L (ref 15–37)
BILIRUB SERPL-MCNC: 0.5 MG/DL (ref 0.1–2)
BUN BLD-MCNC: 14 MG/DL (ref 9–23)
CALCIUM BLD-MCNC: 9 MG/DL (ref 8.5–10.1)
CHLORIDE SERPL-SCNC: 106 MMOL/L (ref 98–112)
CHOLEST SERPL-MCNC: 252 MG/DL (ref ?–200)
CO2 SERPL-SCNC: 20 MMOL/L (ref 21–32)
CREAT BLD-MCNC: 0.72 MG/DL
CREAT UR-SCNC: 65.9 MG/DL
EGFRCR SERPLBLD CKD-EPI 2021: 113 ML/MIN/1.73M2 (ref 60–?)
ERYTHROCYTE [DISTWIDTH] IN BLOOD BY AUTOMATED COUNT: 12 %
EST. AVERAGE GLUCOSE BLD GHB EST-MCNC: 321 MG/DL (ref 68–126)
FASTING PATIENT LIPID ANSWER: YES
FASTING STATUS PATIENT QL REPORTED: YES
GLOBULIN PLAS-MCNC: 3.8 G/DL (ref 2.8–4.4)
GLUCOSE BLD-MCNC: 278 MG/DL (ref 70–99)
HBA1C MFR BLD: 12.8 % (ref ?–5.7)
HCT VFR BLD AUTO: 48.4 %
HDLC SERPL-MCNC: 45 MG/DL (ref 40–59)
HGB BLD-MCNC: 16.6 G/DL
LDLC SERPL CALC-MCNC: 107 MG/DL (ref ?–100)
MCH RBC QN AUTO: 31.9 PG (ref 26–34)
MCHC RBC AUTO-ENTMCNC: 34.3 G/DL (ref 31–37)
MCV RBC AUTO: 93.1 FL
MICROALBUMIN UR-MCNC: 2.04 MG/DL
MICROALBUMIN/CREAT 24H UR-RTO: 31 UG/MG (ref ?–30)
NONHDLC SERPL-MCNC: 207 MG/DL (ref ?–130)
OSMOLALITY SERPL CALC.SUM OF ELEC: 286 MOSM/KG (ref 275–295)
PLATELET # BLD AUTO: 242 10(3)UL (ref 150–450)
POTASSIUM SERPL-SCNC: 4 MMOL/L (ref 3.5–5.1)
PROT SERPL-MCNC: 7.5 G/DL (ref 6.4–8.2)
RBC # BLD AUTO: 5.2 X10(6)UL
SODIUM SERPL-SCNC: 133 MMOL/L (ref 136–145)
TRIGL SERPL-MCNC: 583 MG/DL (ref 30–149)
TSI SER-ACNC: 0.52 MIU/ML (ref 0.36–3.74)
VLDLC SERPL CALC-MCNC: 103 MG/DL (ref 0–30)
WBC # BLD AUTO: 8.9 X10(3) UL (ref 4–11)

## 2024-02-24 PROCEDURE — 84443 ASSAY THYROID STIM HORMONE: CPT

## 2024-02-24 PROCEDURE — 3046F HEMOGLOBIN A1C LEVEL >9.0%: CPT | Performed by: STUDENT IN AN ORGANIZED HEALTH CARE EDUCATION/TRAINING PROGRAM

## 2024-02-24 PROCEDURE — 85027 COMPLETE CBC AUTOMATED: CPT

## 2024-02-24 PROCEDURE — 82043 UR ALBUMIN QUANTITATIVE: CPT

## 2024-02-24 PROCEDURE — 82570 ASSAY OF URINE CREATININE: CPT

## 2024-02-24 PROCEDURE — 36415 COLL VENOUS BLD VENIPUNCTURE: CPT

## 2024-02-24 PROCEDURE — 83036 HEMOGLOBIN GLYCOSYLATED A1C: CPT

## 2024-02-24 PROCEDURE — 3061F NEG MICROALBUMINURIA REV: CPT | Performed by: STUDENT IN AN ORGANIZED HEALTH CARE EDUCATION/TRAINING PROGRAM

## 2024-02-24 PROCEDURE — 80061 LIPID PANEL: CPT

## 2024-02-24 PROCEDURE — 80053 COMPREHEN METABOLIC PANEL: CPT

## 2024-02-24 PROCEDURE — 3060F POS MICROALBUMINURIA REV: CPT | Performed by: STUDENT IN AN ORGANIZED HEALTH CARE EDUCATION/TRAINING PROGRAM

## 2024-02-27 NOTE — PROGRESS NOTES
Mauro Quinonez,     Your recent lab results showed that your A1C (average blood sugar) and lipids are still very high. Please continue to work on the changes that we discussed during your last visit, and we will monitor your labs closely (in 3 months).    Dr. Green

## 2024-03-03 ENCOUNTER — HOSPITAL ENCOUNTER (EMERGENCY)
Age: 49
Discharge: HOME | End: 2024-03-04
Attending: EMERGENCY MEDICINE
Payer: COMMERCIAL

## 2024-03-03 ENCOUNTER — APPOINTMENT (OUTPATIENT)
Dept: GENERAL RADIOLOGY | Age: 49
End: 2024-03-03
Attending: EMERGENCY MEDICINE
Payer: COMMERCIAL

## 2024-03-03 DIAGNOSIS — R07.89 CHEST PAIN, ATYPICAL: Primary | ICD-10-CM

## 2024-03-03 LAB
ALBUMIN SERPL-MCNC: 3.6 G/DL (ref 3.4–5)
ALBUMIN/GLOB SERPL: 0.9 {RATIO} (ref 1–2)
ALP LIVER SERPL-CCNC: 96 U/L
ALT SERPL-CCNC: 33 U/L
ANION GAP SERPL CALC-SCNC: 6 MMOL/L (ref 0–18)
AST SERPL-CCNC: 10 U/L (ref 15–37)
BASOPHILS # BLD AUTO: 0.12 X10(3) UL (ref 0–0.2)
BASOPHILS NFR BLD AUTO: 1.2 %
BILIRUB SERPL-MCNC: 0.4 MG/DL (ref 0.1–2)
BUN BLD-MCNC: 19 MG/DL (ref 9–23)
CALCIUM BLD-MCNC: 9.3 MG/DL (ref 8.5–10.1)
CHLORIDE SERPL-SCNC: 104 MMOL/L (ref 98–112)
CO2 SERPL-SCNC: 24 MMOL/L (ref 21–32)
CREAT BLD-MCNC: 0.9 MG/DL
EGFRCR SERPLBLD CKD-EPI 2021: 105 ML/MIN/1.73M2 (ref 60–?)
EOSINOPHIL # BLD AUTO: 0.45 X10(3) UL (ref 0–0.7)
EOSINOPHIL NFR BLD AUTO: 4.5 %
ERYTHROCYTE [DISTWIDTH] IN BLOOD BY AUTOMATED COUNT: 12.5 %
GLOBULIN PLAS-MCNC: 3.9 G/DL (ref 2.8–4.4)
GLUCOSE BLD-MCNC: 301 MG/DL (ref 70–99)
HCT VFR BLD AUTO: 47.4 %
HGB BLD-MCNC: 16.4 G/DL
IMM GRANULOCYTES # BLD AUTO: 0.09 X10(3) UL (ref 0–1)
IMM GRANULOCYTES NFR BLD: 0.9 %
LYMPHOCYTES # BLD AUTO: 2.71 X10(3) UL (ref 1–4)
LYMPHOCYTES NFR BLD AUTO: 27.3 %
MCH RBC QN AUTO: 31.4 PG (ref 26–34)
MCHC RBC AUTO-ENTMCNC: 34.6 G/DL (ref 31–37)
MCV RBC AUTO: 90.6 FL
MONOCYTES # BLD AUTO: 0.96 X10(3) UL (ref 0.1–1)
MONOCYTES NFR BLD AUTO: 9.7 %
NEUTROPHILS # BLD AUTO: 5.6 X10 (3) UL (ref 1.5–7.7)
NEUTROPHILS # BLD AUTO: 5.6 X10(3) UL (ref 1.5–7.7)
NEUTROPHILS NFR BLD AUTO: 56.4 %
OSMOLALITY SERPL CALC.SUM OF ELEC: 292 MOSM/KG (ref 275–295)
PLATELET # BLD AUTO: 223 10(3)UL (ref 150–450)
POTASSIUM SERPL-SCNC: 4 MMOL/L (ref 3.5–5.1)
PROT SERPL-MCNC: 7.5 G/DL (ref 6.4–8.2)
RBC # BLD AUTO: 5.23 X10(6)UL
SODIUM SERPL-SCNC: 134 MMOL/L (ref 136–145)
TROPONIN I SERPL HS-MCNC: 5 NG/L
WBC # BLD AUTO: 9.9 X10(3) UL (ref 4–11)

## 2024-03-03 PROCEDURE — S0028 INJECTION, FAMOTIDINE, 20 MG: HCPCS | Performed by: EMERGENCY MEDICINE

## 2024-03-03 PROCEDURE — 93005 ELECTROCARDIOGRAM TRACING: CPT

## 2024-03-03 PROCEDURE — 80053 COMPREHEN METABOLIC PANEL: CPT | Performed by: EMERGENCY MEDICINE

## 2024-03-03 PROCEDURE — 85025 COMPLETE CBC W/AUTO DIFF WBC: CPT | Performed by: EMERGENCY MEDICINE

## 2024-03-03 PROCEDURE — 84484 ASSAY OF TROPONIN QUANT: CPT | Performed by: EMERGENCY MEDICINE

## 2024-03-03 PROCEDURE — 99285 EMERGENCY DEPT VISIT HI MDM: CPT

## 2024-03-03 PROCEDURE — 93010 ELECTROCARDIOGRAM REPORT: CPT

## 2024-03-03 PROCEDURE — 71045 X-RAY EXAM CHEST 1 VIEW: CPT | Performed by: EMERGENCY MEDICINE

## 2024-03-03 RX ORDER — FAMOTIDINE 10 MG/ML
20 INJECTION, SOLUTION INTRAVENOUS ONCE
Status: COMPLETED | OUTPATIENT
Start: 2024-03-03 | End: 2024-03-04

## 2024-03-04 VITALS
TEMPERATURE: 98 F | HEIGHT: 69 IN | HEART RATE: 79 BPM | SYSTOLIC BLOOD PRESSURE: 145 MMHG | DIASTOLIC BLOOD PRESSURE: 87 MMHG | OXYGEN SATURATION: 96 % | RESPIRATION RATE: 16 BRPM | WEIGHT: 186 LBS | BODY MASS INDEX: 27.55 KG/M2

## 2024-03-04 LAB
ATRIAL RATE: 82 BPM
P AXIS: 26 DEGREES
P-R INTERVAL: 172 MS
Q-T INTERVAL: 356 MS
QRS DURATION: 98 MS
QTC CALCULATION (BEZET): 415 MS
R AXIS: 13 DEGREES
T AXIS: 30 DEGREES
TROPONIN I SERPL HS-MCNC: 4 NG/L
VENTRICULAR RATE: 82 BPM

## 2024-03-04 PROCEDURE — 96361 HYDRATE IV INFUSION ADD-ON: CPT

## 2024-03-04 PROCEDURE — 84484 ASSAY OF TROPONIN QUANT: CPT | Performed by: EMERGENCY MEDICINE

## 2024-03-04 PROCEDURE — 96374 THER/PROPH/DIAG INJ IV PUSH: CPT

## 2024-03-04 NOTE — ED PROVIDER NOTES
Patient Seen in: [facility] In [place]      History     Chief Complaint   Patient presents with    Chest Pain Angina     Stated Complaint: left side chest pain since 1800    Subjective:   HPI    Patient has a medical history as noted below, also includes tobacco use but no family history of CAD.    He had a greasy sandwich around 6 PM and since then he has been having chest discomfort that he describes as \"heartburn\".  Sensation radiates up to his throat.  Comes and goes, last for 15 minutes at a time.  It was worse when he lays down and gets better if he sits up.  Denies any exertional symptoms.    Today symptoms not associated with shortness of breath diaphoresis or nausea or vomiting.  Pain does radiate somewhat to the face and shoulder    No known heart disease.    Took Tums and omeprazole prior to arrival did not help.    Objective:   Past Medical History:   Diagnosis Date    Back pain     Decorative tattoo     Diabetes (HCC)     High blood pressure     Hyperlipidemia     Pain in joints     Wears glasses               Past Surgical History:   Procedure Laterality Date    COLONOSCOPY      COLONOSCOPY N/A 02/08/2018    Procedure: COLONOSCOPY, POSSIBLE BIOPSY, POSSIBLE POLYPECTOMY 41311;  Surgeon: Donavan Varela MD;  [facility]    COLOSTOMY      Due to diverticulitis    HERNIA SURGERY      OTHER SURGICAL HISTORY      SKIN SURGERY      VASECTOMY  1997                Social History     Socioeconomic History    Marital status:    Occupational History    Occupation: Sales      Employer: [employer]   Tobacco Use    Smoking status: Every Day     Packs/day: 1.00     Years: 20.00     Additional pack years: 0.00     Total pack years: 20.00     Types: Cigarettes     Passive exposure: Never    Smokeless tobacco: Never    Tobacco comments:     Patient not interested in counseling at this time   Vaping Use    Vaping Use: Never used   Substance and Sexual Activity     Alcohol use: Yes     Alcohol/week: 12.0 standard drinks of alcohol     Types: 12 Cans of beer per week    Drug use: Not Currently     Types: Cannabis    Sexual activity: Yes     Partners: Female   Other Topics Concern    Caffeine Concern No    Exercise No    Seat Belt No    Special Diet No    Stress Concern No    Weight Concern No              Review of Systems    Positive for stated complaint: left side chest pain since 1800  Other systems are as noted in HPI.  Constitutional and vital signs reviewed.      All other systems reviewed and negative except as noted above.    Physical Exam     ED Triage Vitals   BP 03/03/24 2319 (!) 142/97   Pulse 03/03/24 2317 84   Resp 03/03/24 2317 16   Temp 03/03/24 2317 98.9 °F (37.2 °C)   Temp src 03/03/24 2317 Oral   SpO2 03/03/24 2317 97 %   O2 Device 03/03/24 2317 None (Room air)       Current:/87   Pulse 79   Temp 98 °F (36.7 °C) (Oral)   Resp 16   Ht 175.3 cm (5' 9\")   Wt 84.4 kg   SpO2 96%   BMI 27.47 kg/m²         Physical Exam      Constitutional: Awake, alert, age appearing, non-toxic  Head: Normocephalic and atraumatic.     Eyes: EOM are normal. Pupils are equal, round, and reactive to light.   Neck: Normal range of motion. Neck supple. No JVD present.     Cardiovascular: Normal rate and regular rhythm.  Normal peripheral perfusion with good color.  Pulmonary/Chest: Normal effort.  No accessory muscle use.  No clubbing, no cyanosis.  Abdominal: Soft. There is no tenderness. There is no guarding.     Musculoskeletal: No swelling, deformity or ecchymosis.    Neurological: Pt is alert and oriented to person, place, and time. No cranial nerve deficit.     Skin: Skin is warm and dry.   Psychiatric: Normal mood and affect. Thought content normal.     Lungs clear no murmurs      No lower extreme tenderness or peripheral edema    ED Course     Labs Reviewed   COMP METABOLIC PANEL (14) - Abnormal; Notable for the following components:       Result Value    Glucose  301 (*)     Sodium 134 (*)     AST 10 (*)     A/G Ratio 0.9 (*)     All other components within normal limits   TROPONIN I HIGH SENSITIVITY - Normal   TROPONIN I HIGH SENSITIVITY - Normal   CBC WITH DIFFERENTIAL WITH PLATELET    Narrative:     The following orders were created for panel order CBC With Differential With Platelet.  Procedure                               Abnormality         Status                     ---------                               -----------         ------                     CBC W/ DIFFERENTIAL[[id number]]                              Final result                 Please view results for these tests on the individual orders.   RAINBOW DRAW BLUE   CBC W/ DIFFERENTIAL     EKG    Rate, intervals and axes as noted on EKG Report.  Rate: 82  Rhythm: Sinus Rhythm  Reading: Sinus rhythm without acute ischemia                 Initial troponin negative, repeat troponin ordered for 2-hour kathy glucose 301 otherwise CBC BMP fine    XR CHEST AP PORTABLE  (CPT=71045)    Result Date: 3/3/2024  PROCEDURE:  XR CHEST AP PORTABLE  (CPT=71045)  TECHNIQUE:  AP chest radiograph was obtained.  COMPARISON:  [facility] , XR, XR CHEST AP PORTABLE  (CPT=71045), 5/21/2022, 8:38 AM.  INDICATIONS:  left side chest pain since 1800  PATIENT STATED HISTORY: (As transcribed by Technologist)  Pt c/o left and right sided chest pain,sob and left face numbness that began today.   FINDINGS: Cardiac silhouette and pulmonary vasculature are unremarkable. Linear parenchymal abnormalities the left lung base.  No pneumothorax.  IMPRESSION: Minimal linear parenchymal abnormalities the left lung base may be due to scarring.  This may also represent minimal developing consolidation.   LOCATION:  [facility]      Dictated by (CST): Manjinder Eckert MD on 3/03/2024 at 11:39 PM     Finalized by (CST): Manjinder Eckert MD on 3/03/2024 at 11:40 PM               MDM          Differential diagnoses considered: Life threatening presentation of ACS,  reflux, esophagitis, pneumonia, pneumothorax all considered    -Factors arguing against ACS is a positional component, not exertional pain, and initial negative troponin.  Will repeat a 2-hour troponin given risk factors, and radiation.  -Favoring GI etiology however he is a diabetic, has hyperlipidemia and smokes tobacco, will obtain 2-hour Trop and reevaluate      0222  Reviewed troponin negative.  Agrees with safe to discharge home.  Omeprazole daily.  Close outpatient follow-up PCP to discuss whether further diagnostic testing is needed.      *My independent interpretation of radiographs: No acute CHF on chest x-ray    *Discussion of ongoing management of this patient's care included: n/a  *Comorbidities contributing to the complexity of decision making: Diabetes, high cholesterol, tobacco use  *External charts reviewed: n/a  *Additional sources of history: n/a    Shared decision making was done by: patient, myself.            Heart Score:    HEART Score      Title      Criteria Score   Age: 45-64 Age Score: 1   History: Slightly Suspicious Hx Score: 0     EKG: Normal EKG Score: 0   HTN: No   Hypercholesterolemia: Yes   Atherosclerosis/PVD: No     DM: Yes   BMI>30kg/m2: No   Smoking: Yes   Family History: No         Other Risk Factor Score: 3             Lab Results   Component Value Date    TROPHS 4 03/04/2024           HEART Score: 3        Risk of adverse cardiac event is 0.9-1.7%                                     Medical Decision Making      Disposition and Plan     Clinical Impression:  1. Chest pain, atypical         Disposition:  Discharge  3/4/2024  1:55 am    Follow-up:  Mirella Green MD  [address] [address] [address]  [phone]    Follow up            Medications Prescribed:  Discharge Medication List as of 3/4/2024  2:02 AM

## 2024-03-04 NOTE — DISCHARGE INSTRUCTIONS
Take omeprazole, 20 mg daily in the morning on an empty stomach for the next 3 days.  Be sure to follow-up with your primary care doctor for reevaluation, particularly if your symptoms persist

## 2024-03-05 ENCOUNTER — PATIENT OUTREACH (OUTPATIENT)
Dept: CASE MANAGEMENT | Age: 49
End: 2024-03-05

## 2024-03-13 ENCOUNTER — OFFICE VISIT (OUTPATIENT)
Dept: FAMILY MEDICINE CLINIC | Facility: CLINIC | Age: 49
End: 2024-03-13
Payer: COMMERCIAL

## 2024-03-13 VITALS
SYSTOLIC BLOOD PRESSURE: 130 MMHG | WEIGHT: 186.38 LBS | DIASTOLIC BLOOD PRESSURE: 80 MMHG | OXYGEN SATURATION: 97 % | HEIGHT: 69 IN | RESPIRATION RATE: 16 BRPM | BODY MASS INDEX: 27.6 KG/M2 | TEMPERATURE: 98 F | HEART RATE: 90 BPM

## 2024-03-13 DIAGNOSIS — E11.65 TYPE 2 DIABETES MELLITUS WITH HYPERGLYCEMIA, WITH LONG-TERM CURRENT USE OF INSULIN (HCC): Primary | ICD-10-CM

## 2024-03-13 DIAGNOSIS — Z79.4 TYPE 2 DIABETES MELLITUS WITH HYPERGLYCEMIA, WITH LONG-TERM CURRENT USE OF INSULIN (HCC): Primary | ICD-10-CM

## 2024-03-13 DIAGNOSIS — E78.2 MIXED HYPERLIPIDEMIA: ICD-10-CM

## 2024-03-13 PROCEDURE — 3079F DIAST BP 80-89 MM HG: CPT | Performed by: STUDENT IN AN ORGANIZED HEALTH CARE EDUCATION/TRAINING PROGRAM

## 2024-03-13 PROCEDURE — 3008F BODY MASS INDEX DOCD: CPT | Performed by: STUDENT IN AN ORGANIZED HEALTH CARE EDUCATION/TRAINING PROGRAM

## 2024-03-13 PROCEDURE — 99214 OFFICE O/P EST MOD 30 MIN: CPT | Performed by: STUDENT IN AN ORGANIZED HEALTH CARE EDUCATION/TRAINING PROGRAM

## 2024-03-13 PROCEDURE — 3075F SYST BP GE 130 - 139MM HG: CPT | Performed by: STUDENT IN AN ORGANIZED HEALTH CARE EDUCATION/TRAINING PROGRAM

## 2024-03-13 RX ORDER — GLUCOSAMINE HCL/CHONDROITIN SU 500-400 MG
1 CAPSULE ORAL 3 TIMES DAILY
Qty: 300 STRIP | Refills: 3 | Status: SHIPPED | OUTPATIENT
Start: 2024-03-13

## 2024-03-13 RX ORDER — INSULIN LISPRO 100 [IU]/ML
5 INJECTION, SOLUTION INTRAVENOUS; SUBCUTANEOUS
Qty: 30 ML | Refills: 2 | Status: SHIPPED | OUTPATIENT
Start: 2024-03-13

## 2024-03-13 NOTE — PROGRESS NOTES
Subjective:      Chief Complaint   Patient presents with    Diabetes     F/up - taking insulin 40 units daily - states has changed his diet      HISTORY OF PRESENT ILLNESS  Diabetes      HPI obtained per patient report.  Cristiano Hicks is a pleasant 48 year old male presenting for follow-up for his diabetes.   He is accompanied by his wife today.  He has been doing well with making low-carbohydrate dietary modifications.   He has titrated his long-acting insulin dose up to 40 units at bedtime.   His BG levels have been mostly in the 200s.  He has an appointment with ophthalmology next month.    PAST PATIENT HISTORY  Past Medical History:   Diagnosis Date    Back pain     Decorative tattoo     Diabetes (HCC)     High blood pressure     Hyperlipidemia     Pain in joints     Wears glasses      Past Surgical History:   Procedure Laterality Date    COLONOSCOPY      COLONOSCOPY N/A 02/08/2018    Procedure: COLONOSCOPY, POSSIBLE BIOPSY, POSSIBLE POLYPECTOMY 80774;  Surgeon: Donavan Varela MD;  [facility]    COLOSTOMY      Due to diverticulitis    HERNIA SURGERY      OTHER SURGICAL HISTORY      SKIN SURGERY      VASECTOMY  1997       CURRENT MEDICATIONS  Outpatient Medications Marked as Taking for the 3/13/24 encounter (Office Visit) with Mirella Green MD   Medication Sig Dispense Refill    Insulin Glargine, 1 Unit Dial, 300 UNIT/ML Subcutaneous Solution Pen-injector Inject 45 Units into the skin every evening. 30 mL 3    Continuous Blood Gluc Sensor (FREESTYLE ED 2 SENSOR) Does not apply Misc 1 each every 14 (fourteen) days. 8 each 1    Glucose Blood (BLOOD GLUCOSE TEST) In Vitro Strip Inject 1 strip into the skin in the morning, at noon, and at bedtime. 300 strip 3    Insulin Lispro, 1 Unit Dial, (HUMALOG KWIKPEN) 100 UNIT/ML Subcutaneous Solution Pen-injector Inject 5 Units into the skin 2 (two) times daily before meals. 30 mL 2    Continuous Blood Gluc Sensor (FREESTYLE ED 2 SENSOR) Does not  apply Misc 1 each every 14 (fourteen) days. 1 each 0    gabapentin 600 MG Oral Tab Take 1 tablet (600 mg total) by mouth 3 (three) times daily. 90 tablet 0    Lancets 33G Does not apply Misc 1 Lancet by Finger stick route daily. 100 each 0    Blood Glucose Monitoring Suppl (BLOOD GLUCOSE MONITOR SYSTEM) w/Device Does not apply Kit Check blood sugar daily 1 kit 0    pantoprazole 40 MG Oral Tab EC Take one tablet (40 mg total) by mouth once daily, 30 minutes prior to breakfast. 90 tablet 3    naproxen 500 MG Oral Tab Take 1 tablet (500 mg total) by mouth 2 (two) times daily as needed (pain). Take with food. 60 tablet 1    Naloxone HCl 4 MG/0.1ML Nasal Liquid 4 mg by Nasal route as needed. If patient remains unresponsive, repeat dose in other nostril 2-5 minutes after first dose. 1 kit 0    ONETOUCH DELICA LANCETS 33G Does not apply Misc TEST DAILY AS NEEDED 100 each 5    Glucose Blood (ONETOUCH ULTRA BLUE) In Vitro Strip TEST DAILY AS NEEDED 100 strip 5    Glucose Blood (FREESTYLE TEST) In Vitro Strip Check sugar once daily as needed 100 strip 11    FREESTYLE SYSTEM Does not apply Kit 1 each by Does not apply route daily as needed for Other. 1 each 0       HEALTH MAINTENANCE  Immunization History   Administered Date(s) Administered    FLU VAC QIV SPLIT 3 YRS AND OLDER (75098) 11/04/2019, 01/04/2021    Pneumovax 23 05/04/2018    TDAP 07/21/2015       ALLERGIES AND DRUG REACTIONS  Allergies   Allergen Reactions    Tramadol NAUSEA AND VOMITING    Metformin DIARRHEA       Family History   Problem Relation Age of Onset    Diabetes Father     Hypertension Mother     No Known Problems Sister     Colon Cancer Neg     Prostate Cancer Neg      Social History     Socioeconomic History    Marital status:    Occupational History    Occupation: Sales      Employer: [employer]   Tobacco Use    Smoking status: Every Day     Packs/day: 1.00     Years: 20.00     Additional pack years: 0.00     Total pack years: 20.00      Types: Cigarettes     Passive exposure: Never    Smokeless tobacco: Never    Tobacco comments:     Patient not interested in counseling at this time   Vaping Use    Vaping Use: Never used   Substance and Sexual Activity    Alcohol use: Yes     Alcohol/week: 12.0 standard drinks of alcohol     Types: 12 Cans of beer per week    Drug use: Not Currently     Types: Cannabis    Sexual activity: Yes     Partners: Female   Other Topics Concern    Caffeine Concern No    Exercise No    Seat Belt No    Special Diet No    Stress Concern No    Weight Concern No       Review of Systems   Neurological:  Positive for numbness.   All other systems reviewed and are negative.         Objective:      /80   Pulse 90   Temp 97.9 °F (36.6 °C) (Temporal)   Resp 16   Ht 5' 9\" (1.753 m)   Wt 186 lb 6 oz (84.5 kg)   SpO2 97%   BMI 27.52 kg/m²   Body mass index is 27.52 kg/m².    Physical Exam  Vitals reviewed.   Constitutional:       General: He is not in acute distress.     Appearance: He is not ill-appearing, toxic-appearing or diaphoretic.   HENT:      Head: Normocephalic and atraumatic.   Eyes:      General: No scleral icterus.        Right eye: No discharge.         Left eye: No discharge.      Conjunctiva/sclera: Conjunctivae normal.   Cardiovascular:      Rate and Rhythm: Normal rate.   Pulmonary:      Effort: Pulmonary effort is normal.   Abdominal:      General: There is no distension.      Palpations: Abdomen is soft. There is no mass.      Tenderness: There is no abdominal tenderness. There is no guarding or rebound.   Musculoskeletal:      Cervical back: Neck supple.      Right lower leg: No edema.      Left lower leg: No edema.   Skin:     General: Skin is warm and dry.      Coloration: Skin is not jaundiced or pale.      Findings: No bruising, erythema or rash.      Comments: Insulin injection sites and CGM sensor application sites WNL without any signs of infection or scarring   Neurological:      Mental Status:  He is alert and oriented to person, place, and time.            Assessment and Plan:      1. Type 2 diabetes mellitus with hyperglycemia, with long-term current use of insulin (HCC) (Primary)  -     Insulin Glargine (1 Unit Dial); Inject 45 Units into the skin every evening.  Dispense: 30 mL; Refill: 3  -     FreeStyle Anali 2 Sensor; 1 each every 14 (fourteen) days.  Dispense: 8 each; Refill: 1  -     Blood Glucose Test; Inject 1 strip into the skin in the morning, at noon, and at bedtime.  Dispense: 300 strip; Refill: 3  -     Insulin Lispro (1 Unit Dial); Inject 5 Units into the skin 2 (two) times daily before meals.  Dispense: 30 mL; Refill: 2  2. Mixed hyperlipidemia    Return in about 1 month (around 4/13/2024).    DM2 with HLD  - complicated by diabetic neuropathy  - last A1C 12/8 2/24/24  - BG's improving but still elevated  - recommended increasing insulin glargine dose to 45 units at bedtime. Recommended this dose for the next 3 days, then increasing the dose by 5 units every 3 days to achieve a goal of fasting BG <130  - recommended addition of short-acting insulin lispro 5 units before meals  - recommended checking BG 3-4 times daily   - unable to tolerate jardiance, metformin, and metformin ER due to adverse effects  - recommended annual diabetic eye exam as planned next month to R/O contraindications to starting semaglutide  - continue gabapentin for neuropathy symptoms  - will plan to recheck lipid panel and A1C in 2.5 months  - follow-up in 2-4 weeks    Patient verbalized understanding of assessment and recommendations. All questions and concerns were addressed.    Electronically signed by Mirella Green MD

## 2024-04-04 ENCOUNTER — TELEMEDICINE (OUTPATIENT)
Dept: FAMILY MEDICINE CLINIC | Facility: CLINIC | Age: 49
End: 2024-04-04
Payer: COMMERCIAL

## 2024-04-04 DIAGNOSIS — J06.9 UPPER RESPIRATORY TRACT INFECTION, UNSPECIFIED TYPE: Primary | ICD-10-CM

## 2024-04-04 RX ORDER — AMOXICILLIN 500 MG/1
500 CAPSULE ORAL 2 TIMES DAILY
Qty: 20 CAPSULE | Refills: 0 | Status: SHIPPED | OUTPATIENT
Start: 2024-04-04 | End: 2024-04-14

## 2024-04-04 NOTE — PROGRESS NOTES
Subjective:   Patient ID: Cristiano Hicks is a 49 year old male.    HPI  Mr. Hicks is a pleasant 49-year-old gentleman with history of diabetes mellitus, hyperlipidemia presenting for video visit today for cough which is productive of phlegm associated with chills and bodyaches and fatigue.  This has been going on for 1 and half weeks.  Cough is productive of yellowish sputum.  No fever no shortness of breath no nausea no vomiting no abdominal pain.  No sick contacts that he knows of.  He had tried over-the-counter decongestions but with not much improvement. I had reviewed past medical and family histories together with allergy and medication lists documented.    History/Other:   Review of Systems   Gastrointestinal:  Negative for abdominal pain, diarrhea, nausea and vomiting.     Current Outpatient Medications   Medication Sig Dispense Refill    amoxicillin 500 MG Oral Cap Take 1 capsule (500 mg total) by mouth 2 (two) times daily for 10 days. 20 capsule 0    Insulin Glargine, 1 Unit Dial, 300 UNIT/ML Subcutaneous Solution Pen-injector Inject 45 Units into the skin every evening. 30 mL 3    Continuous Blood Gluc Sensor (FREESTYLE ED 2 SENSOR) Does not apply Misc 1 each every 14 (fourteen) days. 8 each 1    Glucose Blood (BLOOD GLUCOSE TEST) In Vitro Strip Inject 1 strip into the skin in the morning, at noon, and at bedtime. 300 strip 3    Insulin Lispro, 1 Unit Dial, (HUMALOG KWIKPEN) 100 UNIT/ML Subcutaneous Solution Pen-injector Inject 5 Units into the skin 2 (two) times daily before meals. 30 mL 2    Continuous Blood Gluc Sensor (FREESTYLE ED 2 SENSOR) Does not apply Misc 1 each every 14 (fourteen) days. 1 each 0    Lancets 33G Does not apply Misc 1 Lancet by Finger stick route daily. 100 each 0    Blood Glucose Monitoring Suppl (BLOOD GLUCOSE MONITOR SYSTEM) w/Device Does not apply Kit Check blood sugar daily 1 kit 0    pantoprazole 40 MG Oral Tab EC Take one tablet (40 mg total) by mouth once daily, 30  minutes prior to breakfast. 90 tablet 3    naproxen 500 MG Oral Tab Take 1 tablet (500 mg total) by mouth 2 (two) times daily as needed (pain). Take with food. 60 tablet 1    Naloxone HCl 4 MG/0.1ML Nasal Liquid 4 mg by Nasal route as needed. If patient remains unresponsive, repeat dose in other nostril 2-5 minutes after first dose. 1 kit 0    ONETOUCH DELICA LANCETS 33G Does not apply Misc TEST DAILY AS NEEDED 100 each 5    Glucose Blood (ONETOUCH ULTRA BLUE) In Vitro Strip TEST DAILY AS NEEDED 100 strip 5    Glucose Blood (FREESTYLE TEST) In Vitro Strip Check sugar once daily as needed 100 strip 11    FREESTYLE SYSTEM Does not apply Kit 1 each by Does not apply route daily as needed for Other. 1 each 0     Allergies:  Allergies   Allergen Reactions    Tramadol NAUSEA AND VOMITING    Metformin DIARRHEA       Objective:   Physical Exam  Constitutional:       General: He is not in acute distress.  Neurological:      Mental Status: He is alert.         Assessment & Plan:   1. Upper respiratory tract infection, unspecified type    -Keep hydrated  - May continue with oral decongestants  - May take Tylenol as needed for fever or pain  - We will treat with amoxicillin which was sent to his pharmacy  - Call or come in sooner if symptoms worsen or persist      This note was prepared using Dragon [employer] voice recognition dictation software. As a result errors may occur. When identified these errors have been corrected. While every attempt is made to correct errors during dictation discrepancies may still exist            No orders of the defined types were placed in this encounter.      Meds This Visit:  Requested Prescriptions     Signed Prescriptions Disp Refills    amoxicillin 500 MG Oral Cap 20 capsule 0     Sig: Take 1 capsule (500 mg total) by mouth 2 (two) times daily for 10 days.       Imaging & Referrals:  None

## 2024-04-09 DIAGNOSIS — G62.9 NEUROPATHY: ICD-10-CM

## 2024-04-10 DIAGNOSIS — M79.671 RIGHT FOOT PAIN: ICD-10-CM

## 2024-04-10 DIAGNOSIS — Z79.4 TYPE 2 DIABETES MELLITUS WITH HYPERGLYCEMIA, WITH LONG-TERM CURRENT USE OF INSULIN (HCC): ICD-10-CM

## 2024-04-10 DIAGNOSIS — E11.65 TYPE 2 DIABETES MELLITUS WITH HYPERGLYCEMIA, WITH LONG-TERM CURRENT USE OF INSULIN (HCC): ICD-10-CM

## 2024-04-10 RX ORDER — GABAPENTIN 100 MG/1
100 CAPSULE ORAL 3 TIMES DAILY
Qty: 270 CAPSULE | Refills: 0 | OUTPATIENT
Start: 2024-04-10

## 2024-04-11 RX ORDER — GABAPENTIN 600 MG/1
600 TABLET ORAL 3 TIMES DAILY
Qty: 90 TABLET | Refills: 0 | Status: SHIPPED | OUTPATIENT
Start: 2024-04-11

## 2024-04-11 NOTE — TELEPHONE ENCOUNTER
Requested Renewals     Name from pharmacy: GABAPENTIN 600MG TABLETS         Will file in chart as: GABAPENTIN 600 MG Oral Tab     Possible duplicate: Raj to review recent actions on this medication    Sig: TAKE 1 TABLET(600 MG) BY MOUTH THREE TIMES DAILY    Disp: 90 tablet    Refills: 0 (Pharmacy requested: Not specified)    Start: 4/10/2024    Class: Normal    Non-formulary For: Type 2 diabetes mellitus with hyperglycemia, with long-term current use of insulin (HCC); Right foot pain    Last ordered: 1 month ago (2/22/2024) by Mirella Green MD    Last refill: 2/22/2024    Rx #: [id number]    Neurology Medications Iddqlz05/10/2024 07:20 PM   Protocol Details In person appointment or virtual visit in the past 6 mos or appointment in next 3 mos             Future Appointments   Date Time Provider Department Center   4/25/2024 10:00 AM [facility]     LOV: 4/4/24 telemedicine for cough  In-person visit 3/13/24 for DM  RTC: 1 month  Last A1C: 12.8 done 2/24/24  Last refill given on 2/22/24 for #90    RX sent with note stating appt needed for further refills.

## 2024-04-25 ENCOUNTER — HOSPITAL ENCOUNTER (OUTPATIENT)
Dept: ULTRASOUND IMAGING | Facility: HOSPITAL | Age: 49
Discharge: HOME | End: 2024-04-25
Attending: STUDENT IN AN ORGANIZED HEALTH CARE EDUCATION/TRAINING PROGRAM
Payer: COMMERCIAL

## 2024-04-25 DIAGNOSIS — M79.671 RIGHT FOOT PAIN: ICD-10-CM

## 2024-04-25 PROCEDURE — 76881 US COMPL JOINT R-T W/IMG: CPT | Performed by: STUDENT IN AN ORGANIZED HEALTH CARE EDUCATION/TRAINING PROGRAM

## 2024-05-03 ENCOUNTER — OFFICE VISIT (OUTPATIENT)
Dept: FAMILY MEDICINE CLINIC | Facility: CLINIC | Age: 49
End: 2024-05-03
Payer: COMMERCIAL

## 2024-05-03 VITALS
BODY MASS INDEX: 28.35 KG/M2 | HEIGHT: 69 IN | OXYGEN SATURATION: 98 % | WEIGHT: 191.38 LBS | SYSTOLIC BLOOD PRESSURE: 130 MMHG | DIASTOLIC BLOOD PRESSURE: 72 MMHG | TEMPERATURE: 98 F | RESPIRATION RATE: 16 BRPM | HEART RATE: 80 BPM

## 2024-05-03 DIAGNOSIS — M79.671 RIGHT FOOT PAIN: ICD-10-CM

## 2024-05-03 DIAGNOSIS — Z79.4 TYPE 2 DIABETES MELLITUS WITH HYPERGLYCEMIA, WITH LONG-TERM CURRENT USE OF INSULIN (HCC): Primary | ICD-10-CM

## 2024-05-03 DIAGNOSIS — E78.2 MIXED HYPERLIPIDEMIA: ICD-10-CM

## 2024-05-03 DIAGNOSIS — E11.65 TYPE 2 DIABETES MELLITUS WITH HYPERGLYCEMIA, WITH LONG-TERM CURRENT USE OF INSULIN (HCC): Primary | ICD-10-CM

## 2024-05-03 PROCEDURE — 3078F DIAST BP <80 MM HG: CPT | Performed by: STUDENT IN AN ORGANIZED HEALTH CARE EDUCATION/TRAINING PROGRAM

## 2024-05-03 PROCEDURE — 3008F BODY MASS INDEX DOCD: CPT | Performed by: STUDENT IN AN ORGANIZED HEALTH CARE EDUCATION/TRAINING PROGRAM

## 2024-05-03 PROCEDURE — 3075F SYST BP GE 130 - 139MM HG: CPT | Performed by: STUDENT IN AN ORGANIZED HEALTH CARE EDUCATION/TRAINING PROGRAM

## 2024-05-03 PROCEDURE — 99214 OFFICE O/P EST MOD 30 MIN: CPT | Performed by: STUDENT IN AN ORGANIZED HEALTH CARE EDUCATION/TRAINING PROGRAM

## 2024-05-03 NOTE — PROGRESS NOTES
Subjective:      Chief Complaint   Patient presents with    Diabetes     Will schedule an appt with [facility] for DM eye exam    Test Results     Discuss results from US     HISTORY OF PRESENT ILLNESS  Diabetes      HPI obtained per patient report.  Cristiano Hicks is a pleasant 49 year old male presenting for follow-up for his diabetes.    He has been doing well with making low-carbohydrate dietary modifications.   He has titrated his long-acting insulin dose up to 50 units at bedtime and short-acting insulin dose up to 6 units before meals.   His BG levels have been mostly in the mid to upper 100s.    His R foot pain is controlled by gabapentin 300 mg BID and ibuprofen. His R foot pain is still worse compared to his L foot pain.    PAST PATIENT HISTORY  Past Medical History:    Back pain    Decorative tattoo    Diabetes (HCC)    High blood pressure    Hyperlipidemia    Pain in joints    Wears glasses     Past Surgical History:   Procedure Laterality Date    Colonoscopy      Colonoscopy N/A 02/08/2018    Procedure: COLONOSCOPY, POSSIBLE BIOPSY, POSSIBLE POLYPECTOMY 66375;  Surgeon: Donavan Varela MD;  [facility]    Colostomy      Due to diverticulitis    Hernia surgery      Other surgical history      Skin surgery      Vasectomy  1997       CURRENT MEDICATIONS  Outpatient Medications Marked as Taking for the 5/3/24 encounter (Office Visit) with Mirella Green MD   Medication Sig Dispense Refill    Insulin Glargine, 1 Unit Dial, 300 UNIT/ML Subcutaneous Solution Pen-injector Inject 55 Units into the skin every evening. 30 mL 3    GABAPENTIN 600 MG Oral Tab TAKE 1 TABLET(600 MG) BY MOUTH THREE TIMES DAILY 90 tablet 0    Continuous Blood Gluc Sensor (FREESTYLE ED 2 SENSOR) Does not apply Misc 1 each every 14 (fourteen) days. 8 each 1    Glucose Blood (BLOOD GLUCOSE TEST) In Vitro Strip Inject 1 strip into the skin in the morning, at noon, and at bedtime. 300 strip 3    Insulin Lispro, 1  Unit Dial, (HUMALOG KWIKPEN) 100 UNIT/ML Subcutaneous Solution Pen-injector Inject 5 Units into the skin 2 (two) times daily before meals. (Patient taking differently: Inject 6 Units into the skin 2 (two) times daily before meals.) 30 mL 2    Continuous Blood Gluc Sensor (FREESTYLE ED 2 SENSOR) Does not apply Misc 1 each every 14 (fourteen) days. 1 each 0    Lancets 33G Does not apply Misc 1 Lancet by Finger stick route daily. 100 each 0    Blood Glucose Monitoring Suppl (BLOOD GLUCOSE MONITOR SYSTEM) w/Device Does not apply Kit Check blood sugar daily 1 kit 0    pantoprazole 40 MG Oral Tab EC Take one tablet (40 mg total) by mouth once daily, 30 minutes prior to breakfast. 90 tablet 3    naproxen 500 MG Oral Tab Take 1 tablet (500 mg total) by mouth 2 (two) times daily as needed (pain). Take with food. 60 tablet 1    Naloxone HCl 4 MG/0.1ML Nasal Liquid 4 mg by Nasal route as needed. If patient remains unresponsive, repeat dose in other nostril 2-5 minutes after first dose. 1 kit 0    ONETOUCH DELICA LANCETS 33G Does not apply Misc TEST DAILY AS NEEDED 100 each 5    Glucose Blood (ONETOUCH ULTRA BLUE) In Vitro Strip TEST DAILY AS NEEDED 100 strip 5    Glucose Blood (FREESTYLE TEST) In Vitro Strip Check sugar once daily as needed 100 strip 11       HEALTH MAINTENANCE  Immunization History   Administered Date(s) Administered    FLU VAC QIV SPLIT 3 YRS AND OLDER (84748) 11/04/2019, 01/04/2021    Pneumovax 23 05/04/2018    TDAP 07/21/2015       ALLERGIES AND DRUG REACTIONS  Allergies   Allergen Reactions    Tramadol NAUSEA AND VOMITING    Metformin DIARRHEA       Family History   Problem Relation Age of Onset    Diabetes Father     Hypertension Mother     No Known Problems Sister     Colon Cancer Neg     Prostate Cancer Neg      Social History     Socioeconomic History    Marital status:    Occupational History    Occupation: Sales      Employer: [employer]   Tobacco Use    Smoking status: Every Day      Current packs/day: 1.00     Average packs/day: 1 pack/day for 20.0 years (20.0 ttl pk-yrs)     Types: Cigarettes     Passive exposure: Never    Smokeless tobacco: Never    Tobacco comments:     Patient not interested in counseling at this time   Vaping Use    Vaping status: Never Used   Substance and Sexual Activity    Alcohol use: Yes     Alcohol/week: 12.0 standard drinks of alcohol     Types: 12 Cans of beer per week    Drug use: Not Currently     Types: Cannabis    Sexual activity: Yes     Partners: Female   Other Topics Concern    Caffeine Concern No    Exercise No    Seat Belt No    Special Diet No    Stress Concern No    Weight Concern No       Review of Systems   Neurological:  Positive for numbness.   All other systems reviewed and are negative.         Objective:      /72   Pulse 80   Temp 97.5 °F (36.4 °C) (Temporal)   Resp 16   Ht 5' 9\" (1.753 m)   Wt 191 lb 6 oz (86.8 kg)   SpO2 98%   BMI 28.26 kg/m²   Body mass index is 28.26 kg/m².    Physical Exam  Vitals reviewed.   Constitutional:       General: He is not in acute distress.     Appearance: He is not ill-appearing, toxic-appearing or diaphoretic.   HENT:      Head: Normocephalic and atraumatic.   Eyes:      General: No scleral icterus.        Right eye: No discharge.         Left eye: No discharge.      Conjunctiva/sclera: Conjunctivae normal.   Cardiovascular:      Rate and Rhythm: Normal rate.   Pulmonary:      Effort: Pulmonary effort is normal.   Abdominal:      General: There is no distension.   Musculoskeletal:      Cervical back: Neck supple.   Neurological:      Mental Status: He is alert and oriented to person, place, and time.            Assessment and Plan:      1. Type 2 diabetes mellitus with hyperglycemia, with long-term current use of insulin ([facility]) (Primary)  -     Hemoglobin A1C; Future; Expected date: 07/03/2024  -     Insulin Glargine (1 Unit Dial); Inject 55 Units into the skin every evening.  Dispense: 30 mL; Refill:  3  2. Mixed hyperlipidemia  -     Lipid Panel; Future; Expected date: 07/03/2024  3. Right foot pain  -     PODIATRY - INTERNAL    Return in about 2 months (around 7/3/2024).    DM2 with HLD  - complicated by diabetic neuropathy  - BG's improving but still elevated  - recommended increasing insulin glargine dose to 55 units at bedtime. Recommended this dose for the next 3 days, then increasing the dose by 5 units every 3 days to achieve a goal of fasting BG <130  - recommended continuation of short-acting insulin lispro 6 units before meals  - continue checking BG 3-4 times daily   - unable to tolerate jardiance, metformin, and metformin ER due to adverse effects  - recommended annual diabetic eye exam to R/O contraindications to starting semaglutide  - continue gabapentin and ibuprofen for neuropathy symptoms. We will have him consult with podiatry for evaluation of his R foot pain which is less typical for neuropathic pain  - recommended labs in 2 months and follow-up afterwards    Patient verbalized understanding of assessment and recommendations. All questions and concerns were addressed.    Electronically signed by Mirella Green MD

## 2024-05-05 ENCOUNTER — HOSPITAL ENCOUNTER (EMERGENCY)
Age: 49
Discharge: HOME | End: 2024-05-05
Attending: EMERGENCY MEDICINE
Payer: COMMERCIAL

## 2024-05-05 ENCOUNTER — APPOINTMENT (OUTPATIENT)
Dept: GENERAL RADIOLOGY | Age: 49
End: 2024-05-05
Attending: EMERGENCY MEDICINE
Payer: COMMERCIAL

## 2024-05-05 VITALS
WEIGHT: 191.38 LBS | TEMPERATURE: 98 F | RESPIRATION RATE: 16 BRPM | HEART RATE: 86 BPM | OXYGEN SATURATION: 98 % | SYSTOLIC BLOOD PRESSURE: 152 MMHG | DIASTOLIC BLOOD PRESSURE: 89 MMHG | BODY MASS INDEX: 28 KG/M2

## 2024-05-05 DIAGNOSIS — S90.129A CONTUSION OF TOE WITHOUT DAMAGE TO NAIL, UNSPECIFIED LATERALITY, UNSPECIFIED TOE, INITIAL ENCOUNTER: Primary | ICD-10-CM

## 2024-05-05 PROCEDURE — 99283 EMERGENCY DEPT VISIT LOW MDM: CPT

## 2024-05-05 PROCEDURE — 28510 TREATMENT OF TOE FRACTURE: CPT

## 2024-05-05 PROCEDURE — 73660 X-RAY EXAM OF TOE(S): CPT | Performed by: EMERGENCY MEDICINE

## 2024-05-05 RX ORDER — ACETAMINOPHEN AND CODEINE PHOSPHATE 300; 30 MG/1; MG/1
2 TABLET ORAL ONCE
Status: COMPLETED | OUTPATIENT
Start: 2024-05-05 | End: 2024-05-05

## 2024-05-05 RX ORDER — ACETAMINOPHEN AND CODEINE PHOSPHATE 300; 30 MG/1; MG/1
1-2 TABLET ORAL EVERY 6 HOURS PRN
Qty: 10 TABLET | Refills: 0 | Status: SHIPPED | OUTPATIENT
Start: 2024-05-05 | End: 2024-05-10

## 2024-05-05 NOTE — ED QUICK NOTES
Patient called back and was updated on xray results. Already has a podiatrist to follow up with. No further questions.

## 2024-05-05 NOTE — ED PROVIDER NOTES
Patient Seen in: [facility] In [place]      History     Chief Complaint   Patient presents with    Leg or Foot Injury     Stated Complaint: Right foot 2nd digit pain    Subjective:   HPI    Patient is a 49-year-old male presenting to the ED with right second toe pain.  The history is obtained from patient who is a good historian.  The patient states that earlier in the day he had a real roll over his toe.  The pain progressively worsened this evening with some development of bruising.  The nails been intact.  The pain is throbbing, constant, 8 out of 10, without identified exacerbating or alleviating factors.  There is no weakness or loss of sensation.  Patient does have a history of diabetes.  Blood sugars have been normal.    Objective:   Past Medical History:    Back pain    Decorative tattoo    Diabetes (HCC)    High blood pressure    Hyperlipidemia    Pain in joints    Wears glasses              Past Surgical History:   Procedure Laterality Date    Colonoscopy      Colonoscopy N/A 02/08/2018    Procedure: COLONOSCOPY, POSSIBLE BIOPSY, POSSIBLE POLYPECTOMY 01712;  Surgeon: Donavan Varela MD;  [facility]    Colostomy      Due to diverticulitis    Hernia surgery      Other surgical history      Skin surgery      Vasectomy  1997                Social History     Socioeconomic History    Marital status:    Occupational History    Occupation: Sales      Employer: [employer]   Tobacco Use    Smoking status: Every Day     Current packs/day: 1.00     Average packs/day: 1 pack/day for 20.0 years (20.0 ttl pk-yrs)     Types: Cigarettes     Passive exposure: Never    Smokeless tobacco: Never    Tobacco comments:     Patient not interested in counseling at this time   Vaping Use    Vaping status: Never Used   Substance and Sexual Activity    Alcohol use: Yes     Alcohol/week: 12.0 standard drinks of alcohol     Types: 12 Cans of beer per week    Drug use: Not  Currently     Types: Cannabis    Sexual activity: Yes     Partners: Female   Other Topics Concern    Caffeine Concern No    Exercise No    Seat Belt No    Special Diet No    Stress Concern No    Weight Concern No              Review of Systems    Positive for stated complaint: Right foot 2nd digit pain  Other systems are as noted in HPI.  Constitutional and vital signs reviewed.      All other systems reviewed and negative except as noted above.    Physical Exam     ED Triage Vitals [05/05/24 0433]   /89   Pulse 86   Resp 16   Temp 98.2 °F (36.8 °C)   Temp src Oral   SpO2 98 %   O2 Device None (Room air)       Current:/89   Pulse 86   Temp 98.2 °F (36.8 °C) (Oral)   Resp 16   Wt 86.8 kg   SpO2 98%   BMI 28.26 kg/m²         Physical Exam  Vitals and nursing note reviewed.   Constitutional:       General: He is not in acute distress.     Appearance: Normal appearance. He is not ill-appearing.   HENT:      Head: Normocephalic and atraumatic.      Right Ear: External ear normal.      Left Ear: External ear normal.      Nose: Nose normal.   Cardiovascular:      Rate and Rhythm: Normal rate and regular rhythm.   Pulmonary:      Effort: Pulmonary effort is normal. No respiratory distress.      Breath sounds: Normal breath sounds.   Musculoskeletal:      Right lower leg: No edema.      Left lower leg: No edema.        Feet:       Comments: Flexion and extension are intact in the right second toe with distal cap refill less than 2 seconds and sensation is intact at baseline (patient does have history of neuropathy)   Skin:     General: Skin is warm.      Capillary Refill: Capillary refill takes less than 2 seconds.      Findings: No rash.   Neurological:      Mental Status: He is alert and oriented to person, place, and time.   Psychiatric:         Mood and Affect: Mood normal.         Behavior: Behavior normal.               ED Course   Labs Reviewed - No data to display                   MDM      History  is obtained from patient who is a good historian.  Differential diagnosis includes contusion versus fracture.  Testing considered and ordered includes x-ray of the foot with attention to the right second toe.  These results were independently reviewed without any acute fracture identified.  Radiology report was also reviewed as noted below.    XR RIGHT TOES  Comparison: None available for review.    IMPRESSION:  No acute bony or articular abnormality.    Patient's previous records were reviewed.  The patient was last seen for diabetes on May 3 in the office with his family practice provider.  Given his history of diabetes, recommended close observation of the toe as there is a very small 1 mm abrasion noted on repeat exam.  Nailbed is intact.  Keep this area clean and monitor for signs or symptoms of infection.  Patient is up-to-date with his tetanus immunization.  Will provide Ortho shoe for comfort.  RICE.  Return to ED if any symptoms worsen, persist, or new symptoms develop.  Patient is requesting pain medications.  States he does not tolerate tramadol and Tylenol and/ibuprofen have not been working for him at home.  Therefore, a very small amount of Tylenol 3 was provided to be used as needed for pain.                           Medical Decision Making      Disposition and Plan     Clinical Impression:  1. Contusion of toe without damage to nail, unspecified laterality, unspecified toe, initial encounter         Disposition:  Discharge  5/5/2024  5:56 am    Follow-up:  No follow-up provider specified.        Medications Prescribed:  Discharge Medication List as of 5/5/2024  6:15 AM        START taking these medications    Details   acetaminophen-codeine 300-30 MG Oral Tab Take 1-2 tablets by mouth every 6 (six) hours as needed for Pain., Normal, Disp-10 tablet, R-0

## 2024-05-05 NOTE — ED QUICK NOTES
Left message for pt to return call regarding xray read.  Tuft fx second toe. Pain meds already on AVS

## 2024-05-06 ENCOUNTER — PATIENT OUTREACH (OUTPATIENT)
Dept: CASE MANAGEMENT | Age: 49
End: 2024-05-06

## 2024-05-06 NOTE — PROGRESS NOTES
1st attempt ER f/up apt request  PCP -decline, pt will f/up w/ POD instead    Dr. Grady  POD  [address]   [phone]  Apt: May 23 @8:30am     Confirmed w/ pt   Closing encounter

## 2024-05-23 ENCOUNTER — OFFICE VISIT (OUTPATIENT)
Dept: PODIATRY CLINIC | Facility: CLINIC | Age: 49
End: 2024-05-23

## 2024-05-23 DIAGNOSIS — M79.2 NEURALGIA OF LEFT FOOT: ICD-10-CM

## 2024-05-23 DIAGNOSIS — M79.2 NEURALGIA OF RIGHT FOOT: ICD-10-CM

## 2024-05-23 DIAGNOSIS — M20.41 HAMMER TOE OF RIGHT FOOT: ICD-10-CM

## 2024-05-23 DIAGNOSIS — S92.531A CLOSED DISPLACED FRACTURE OF DISTAL PHALANX OF LESSER TOE OF RIGHT FOOT, INITIAL ENCOUNTER: Primary | ICD-10-CM

## 2024-05-23 PROCEDURE — 99203 OFFICE O/P NEW LOW 30 MIN: CPT | Performed by: PODIATRIST
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
## 2024-07-29 DIAGNOSIS — Z79.4 TYPE 2 DIABETES MELLITUS WITH HYPERGLYCEMIA, WITH LONG-TERM CURRENT USE OF INSULIN (HCC): ICD-10-CM

## 2024-07-29 DIAGNOSIS — E11.65 TYPE 2 DIABETES MELLITUS WITH HYPERGLYCEMIA, WITH LONG-TERM CURRENT USE OF INSULIN (HCC): ICD-10-CM

## 2024-07-29 DIAGNOSIS — M79.671 RIGHT FOOT PAIN: ICD-10-CM

## 2024-07-30 RX ORDER — GABAPENTIN 600 MG/1
600 TABLET ORAL 3 TIMES DAILY
Qty: 90 TABLET | Refills: 0 | Status: SHIPPED | OUTPATIENT
Start: 2024-07-30

## 2024-07-30 NOTE — TELEPHONE ENCOUNTER
Requested Renewals     Name from pharmacy: GABAPENTIN 600MG TABLETS         Will file in chart as: GABAPENTIN 600 MG Oral Tab    Sig: TAKE 1 TABLET(600 MG) BY MOUTH THREE TIMES DAILY    Disp: 90 tablet    Refills: 0 (Pharmacy requested: Not specified)    Start: 7/29/2024    Class: Normal    Non-formulary For: Type 2 diabetes mellitus with hyperglycemia, with long-term current use of insulin (HCC); Right foot pain    Last ordered: 3 months ago (4/11/2024) by Mirella Green MD    Last refill: 4/11/2024    Rx #: [id number]              No future appointments.  LOV: 5/3/24  Last labs done 2/24/24    -Medication pended for review.

## 2024-09-06 ENCOUNTER — OFFICE VISIT (OUTPATIENT)
Dept: FAMILY MEDICINE CLINIC | Facility: CLINIC | Age: 49
End: 2024-09-06
Payer: COMMERCIAL

## 2024-09-06 VITALS
HEART RATE: 87 BPM | TEMPERATURE: 98 F | WEIGHT: 197.25 LBS | SYSTOLIC BLOOD PRESSURE: 150 MMHG | RESPIRATION RATE: 16 BRPM | HEIGHT: 69 IN | OXYGEN SATURATION: 96 % | BODY MASS INDEX: 29.21 KG/M2 | DIASTOLIC BLOOD PRESSURE: 70 MMHG

## 2024-09-06 DIAGNOSIS — Z79.4 TYPE 2 DIABETES MELLITUS WITH HYPERGLYCEMIA, WITH LONG-TERM CURRENT USE OF INSULIN (HCC): ICD-10-CM

## 2024-09-06 DIAGNOSIS — E11.65 TYPE 2 DIABETES MELLITUS WITH HYPERGLYCEMIA, WITH LONG-TERM CURRENT USE OF INSULIN (HCC): ICD-10-CM

## 2024-09-06 DIAGNOSIS — G62.9 NEUROPATHY: Primary | ICD-10-CM

## 2024-09-06 LAB — HEMOGLOBIN A1C: 8 % (ref 4.3–5.6)

## 2024-09-06 RX ORDER — PEN NEEDLE, DIABETIC 32GX 5/32"
NEEDLE, DISPOSABLE MISCELLANEOUS
Qty: 400 EACH | Refills: 3 | Status: SHIPPED | OUTPATIENT
Start: 2024-09-06

## 2024-09-06 RX ORDER — GABAPENTIN 600 MG/1
600 TABLET ORAL 3 TIMES DAILY
Qty: 90 TABLET | Refills: 1 | Status: SHIPPED | OUTPATIENT
Start: 2024-09-06

## 2024-09-06 RX ORDER — INSULIN LISPRO 100 [IU]/ML
6 INJECTION, SOLUTION INTRAVENOUS; SUBCUTANEOUS
Qty: 15 ML | Refills: 3 | Status: SHIPPED | OUTPATIENT
Start: 2024-09-06

## 2024-09-06 RX ORDER — INSULIN LISPRO 100 [IU]/ML
6 INJECTION, SOLUTION INTRAVENOUS; SUBCUTANEOUS 2 TIMES DAILY
COMMUNITY
End: 2024-09-06

## 2024-09-06 RX ORDER — GABAPENTIN 300 MG/1
300 CAPSULE ORAL 3 TIMES DAILY
Qty: 90 CAPSULE | Refills: 1 | Status: SHIPPED | OUTPATIENT
Start: 2024-09-06

## 2024-09-06 NOTE — PROGRESS NOTES
Subjective:      Chief Complaint   Patient presents with    Diabetes     F/up    Foot Pain     States its still hurting      HISTORY OF PRESENT ILLNESS  HPI  HPI obtained per patient report.  Cristiano Hicks is a pleasant 49 year old male presenting for a follow-up for his diabetes.   He has been continuing to work on eating healthier and keeping active. His fasting BG levels have been in the 150s-200s, and his postprandial BG levels have been below 180s.   He inquires about a possible alternative medication to gabapentin, as he reports shooting pain in his legs when he misses a dose. His B/L foot pain is still inadequately controlled with his current dose of gabapentin.    PAST PATIENT HISTORY  Past Medical History:    Back pain    Decorative tattoo    Diabetes (HCC)    High blood pressure    Hyperlipidemia    Pain in joints    Wears glasses     Past Surgical History:   Procedure Laterality Date    Colonoscopy      Colonoscopy N/A 02/08/2018    Procedure: COLONOSCOPY, POSSIBLE BIOPSY, POSSIBLE POLYPECTOMY 97971;  Surgeon: Donavan Varela MD;  [facility]    Colostomy      Due to diverticulitis    Hernia surgery      Other surgical history      Skin surgery      Vasectomy  1997       CURRENT MEDICATIONS  Outpatient Medications Marked as Taking for the 9/6/24 encounter (Office Visit) with Mirella Green MD   Medication Sig Dispense Refill    Insulin Glargine, 1 Unit Dial, 300 UNIT/ML Subcutaneous Solution Pen-injector Inject 60 Units into the skin every evening. 30 mL 3    Insulin Lispro, 1 Unit Dial, (HUMALOG KWIKPEN) 100 UNIT/ML Subcutaneous Solution Pen-injector Inject 6 Units into the skin 2 (two) times daily before meals. 15 mL 3    Insulin Pen Needle (CARETOUCH PEN NEEDLES) 32G X 4 MM Does not apply Misc Use as directed to inject insulin 3 times daily 400 each 3    gabapentin 300 MG Oral Cap Take 1 capsule (300 mg total) by mouth 3 (three) times daily. 90 capsule 1    gabapentin 600 MG Oral  Tab Take 1 tablet (600 mg total) by mouth 3 (three) times daily. 90 tablet 1    GABAPENTIN 600 MG Oral Tab TAKE 1 TABLET(600 MG) BY MOUTH THREE TIMES DAILY 90 tablet 0    Continuous Blood Gluc Sensor (FREESTYLE ED 2 SENSOR) Does not apply Misc 1 each every 14 (fourteen) days. 8 each 1    Glucose Blood (BLOOD GLUCOSE TEST) In Vitro Strip Inject 1 strip into the skin in the morning, at noon, and at bedtime. 300 strip 3    Continuous Blood Gluc Sensor (FREESTYLE ED 2 SENSOR) Does not apply Misc 1 each every 14 (fourteen) days. 1 each 0    Lancets 33G Does not apply Misc 1 Lancet by Finger stick route daily. 100 each 0    Blood Glucose Monitoring Suppl (BLOOD GLUCOSE MONITOR SYSTEM) w/Device Does not apply Kit Check blood sugar daily 1 kit 0    pantoprazole 40 MG Oral Tab EC Take one tablet (40 mg total) by mouth once daily, 30 minutes prior to breakfast. 90 tablet 3    naproxen 500 MG Oral Tab Take 1 tablet (500 mg total) by mouth 2 (two) times daily as needed (pain). Take with food. 60 tablet 1    Naloxone HCl 4 MG/0.1ML Nasal Liquid 4 mg by Nasal route as needed. If patient remains unresponsive, repeat dose in other nostril 2-5 minutes after first dose. 1 kit 0    ONETOUCH DELICA LANCETS 33G Does not apply Misc TEST DAILY AS NEEDED 100 each 5    Glucose Blood (ONETOUCH ULTRA BLUE) In Vitro Strip TEST DAILY AS NEEDED 100 strip 5    Glucose Blood (FREESTYLE TEST) In Vitro Strip Check sugar once daily as needed 100 strip 11       HEALTH MAINTENANCE  Immunization History   Administered Date(s) Administered    FLU VAC QIV SPLIT 3 YRS AND OLDER (92735) 11/04/2019, 01/04/2021    Pneumovax 23 05/04/2018    TDAP 07/21/2015       ALLERGIES AND DRUG REACTIONS  Allergies   Allergen Reactions    Tramadol NAUSEA AND VOMITING    Metformin DIARRHEA       Family History   Problem Relation Age of Onset    Diabetes Father     Hypertension Mother     No Known Problems Sister     Colon Cancer Neg     Prostate Cancer Neg      Social  History     Socioeconomic History    Marital status:    Occupational History    Occupation: Sales      Employer: [employer]   Tobacco Use    Smoking status: Every Day     Current packs/day: 1.00     Average packs/day: 1 pack/day for 20.0 years (20.0 ttl pk-yrs)     Types: Cigarettes     Passive exposure: Never    Smokeless tobacco: Never    Tobacco comments:     Patient not interested in counseling at this time   Vaping Use    Vaping status: Never Used   Substance and Sexual Activity    Alcohol use: Yes     Alcohol/week: 12.0 standard drinks of alcohol     Types: 12 Cans of beer per week    Drug use: Not Currently     Types: Cannabis    Sexual activity: Yes     Partners: Female   Other Topics Concern    Caffeine Concern No    Exercise No    Seat Belt No    Special Diet No    Stress Concern No    Weight Concern No       Review of Systems   All other systems reviewed and are negative.         Objective:      /70   Pulse 87   Temp 97.7 °F (36.5 °C) (Temporal)   Resp 16   Ht 5' 9\" (1.753 m)   Wt 197 lb 4 oz (89.5 kg)   SpO2 96%   BMI 29.13 kg/m²   Body mass index is 29.13 kg/m².    Physical Exam  Vitals reviewed.   Constitutional:       General: He is not in acute distress.     Appearance: He is not ill-appearing, toxic-appearing or diaphoretic.   HENT:      Head: Normocephalic and atraumatic.   Eyes:      General: No scleral icterus.        Right eye: No discharge.         Left eye: No discharge.      Extraocular Movements: Extraocular movements intact.      Conjunctiva/sclera: Conjunctivae normal.   Cardiovascular:      Rate and Rhythm: Normal rate.   Pulmonary:      Effort: Pulmonary effort is normal.   Abdominal:      General: There is no distension.   Musculoskeletal:      Cervical back: Neck supple.      Right lower leg: No edema.      Left lower leg: No edema.   Neurological:      Mental Status: He is alert and oriented to person, place, and time.            Assessment and Plan:      1.  Neuropathy (Primary)  -     Gabapentin; Take 1 capsule (300 mg total) by mouth 3 (three) times daily.  Dispense: 90 capsule; Refill: 1  -     Gabapentin; Take 1 tablet (600 mg total) by mouth 3 (three) times daily.  Dispense: 90 tablet; Refill: 1  2. Type 2 diabetes mellitus with hyperglycemia, with long-term current use of insulin (HCC)  -     POC Hemoglobin A1C  -     Insulin Glargine (1 Unit Dial); Inject 60 Units into the skin every evening.  Dispense: 30 mL; Refill: 3  -     Insulin Lispro (1 Unit Dial); Inject 6 Units into the skin 2 (two) times daily before meals.  Dispense: 15 mL; Refill: 3  -     CareTouch Pen Needles; Use as directed to inject insulin 3 times daily  Dispense: 400 each; Refill: 3  -     Gabapentin; Take 1 capsule (300 mg total) by mouth 3 (three) times daily.  Dispense: 90 capsule; Refill: 1  -     Gabapentin; Take 1 tablet (600 mg total) by mouth 3 (three) times daily.  Dispense: 90 tablet; Refill: 1    Return in about 3 months (around 12/6/2024) for follow-up for diabetes. please complete labs prior to your visit. .    DM2 with HLD  - complicated by diabetic neuropathy  - his  A1C is 8 today, showing significant improvement from his previous level of 12.8  - his postprandial BG levels are borderline of goal, and his fasting BG levels are mildly above goal  - recommended increasing insulin glargine dose to 60 units at bedtime  - recommended continuation of short-acting insulin lispro 6 units before meals  - continue checking BG 3-4 times daily   - unable to tolerate jardiance, metformin, and metformin ER due to adverse effects  - recommended annual diabetic eye exam to R/O contraindications to starting semaglutide  - we discussed the option to taper off of gabapentin and try lyrica for his neuropathic pain; he would like to do more independent research about lyrica before making this decision, but he is welcome to call or send a [employer] Chart message for a prescription if he decides to start  lyrica. He requests to increase his gabapentin dose in the interim  - recommended labs in 3 months and follow-up afterwards    Patient verbalized understanding of assessment and recommendations. All questions and concerns were addressed.    Electronically signed by Mirella Green MD

## 2024-09-12 ENCOUNTER — TELEPHONE (OUTPATIENT)
Dept: FAMILY MEDICINE CLINIC | Facility: CLINIC | Age: 49
End: 2024-09-12

## 2024-09-12 DIAGNOSIS — E11.65 TYPE 2 DIABETES MELLITUS WITH HYPERGLYCEMIA, WITH LONG-TERM CURRENT USE OF INSULIN (HCC): Primary | ICD-10-CM

## 2024-09-12 DIAGNOSIS — Z79.4 TYPE 2 DIABETES MELLITUS WITH HYPERGLYCEMIA, WITH LONG-TERM CURRENT USE OF INSULIN (HCC): Primary | ICD-10-CM

## 2024-09-12 NOTE — TELEPHONE ENCOUNTER
Per EPic:  Error   9/12/2024 11:37 AM  Payer: [email or web address] [facility] Case ID: [id number]    [phone]    Called [facility] pharmacy and they state medication is non-formulary. No other information given per pharmacy.      Re-submitted to [employer]:  Closed    Close reason: Other  Payer: [employer]    [phone]  Note from payer: Drug is not covered by plan - Prescriber details have been updated to match the prescriber directory.  View History          -routed to PCP.

## 2024-09-12 NOTE — TELEPHONE ENCOUNTER
[facility] stating plan does not cover medication Insulin Glarg.  PA submitted via Epic.   All questions answered.  Awaiting on response.

## 2024-12-14 DIAGNOSIS — Z79.4 TYPE 2 DIABETES MELLITUS WITH HYPERGLYCEMIA, WITH LONG-TERM CURRENT USE OF INSULIN (HCC): ICD-10-CM

## 2024-12-14 DIAGNOSIS — E11.65 TYPE 2 DIABETES MELLITUS WITH HYPERGLYCEMIA, WITH LONG-TERM CURRENT USE OF INSULIN (HCC): ICD-10-CM

## 2024-12-14 DIAGNOSIS — G62.9 NEUROPATHY: ICD-10-CM

## 2024-12-17 RX ORDER — GABAPENTIN 300 MG/1
300 CAPSULE ORAL 3 TIMES DAILY
Qty: 90 CAPSULE | Refills: 1 | OUTPATIENT
Start: 2024-12-17

## 2024-12-17 RX ORDER — GABAPENTIN 600 MG/1
600 TABLET ORAL 3 TIMES DAILY
Qty: 90 TABLET | Refills: 1 | OUTPATIENT
Start: 2024-12-17

## 2024-12-17 RX ORDER — PEN NEEDLE, DIABETIC 32GX 5/32"
NEEDLE, DISPOSABLE MISCELLANEOUS
Qty: 400 EACH | Refills: 3 | OUTPATIENT
Start: 2024-12-17

## 2024-12-27 ENCOUNTER — LAB ENCOUNTER (OUTPATIENT)
Dept: LAB | Age: 49
End: 2024-12-27
Attending: STUDENT IN AN ORGANIZED HEALTH CARE EDUCATION/TRAINING PROGRAM
Payer: COMMERCIAL

## 2024-12-27 ENCOUNTER — OFFICE VISIT (OUTPATIENT)
Dept: FAMILY MEDICINE CLINIC | Facility: CLINIC | Age: 49
End: 2024-12-27
Payer: COMMERCIAL

## 2024-12-27 VITALS
RESPIRATION RATE: 16 BRPM | SYSTOLIC BLOOD PRESSURE: 132 MMHG | WEIGHT: 200 LBS | OXYGEN SATURATION: 97 % | DIASTOLIC BLOOD PRESSURE: 76 MMHG | TEMPERATURE: 97 F | BODY MASS INDEX: 29.62 KG/M2 | HEART RATE: 89 BPM | HEIGHT: 69 IN

## 2024-12-27 DIAGNOSIS — R10.814 LEFT LOWER QUADRANT ABDOMINAL TENDERNESS WITHOUT REBOUND TENDERNESS: ICD-10-CM

## 2024-12-27 DIAGNOSIS — R50.9 FEVER, UNSPECIFIED FEVER CAUSE: ICD-10-CM

## 2024-12-27 DIAGNOSIS — E11.65 TYPE 2 DIABETES MELLITUS WITH HYPERGLYCEMIA, WITH LONG-TERM CURRENT USE OF INSULIN (HCC): Primary | ICD-10-CM

## 2024-12-27 DIAGNOSIS — R53.83 FATIGUE, UNSPECIFIED TYPE: ICD-10-CM

## 2024-12-27 DIAGNOSIS — G89.29 CHRONIC RIGHT-SIDED LOW BACK PAIN WITHOUT SCIATICA: ICD-10-CM

## 2024-12-27 DIAGNOSIS — Z79.4 TYPE 2 DIABETES MELLITUS WITH HYPERGLYCEMIA, WITH LONG-TERM CURRENT USE OF INSULIN (HCC): Primary | ICD-10-CM

## 2024-12-27 DIAGNOSIS — M54.50 CHRONIC RIGHT-SIDED LOW BACK PAIN WITHOUT SCIATICA: ICD-10-CM

## 2024-12-27 DIAGNOSIS — Z23 NEED FOR VACCINATION: ICD-10-CM

## 2024-12-27 DIAGNOSIS — E78.2 MIXED HYPERLIPIDEMIA: ICD-10-CM

## 2024-12-27 DIAGNOSIS — Z12.11 COLON CANCER SCREENING: ICD-10-CM

## 2024-12-27 DIAGNOSIS — G62.9 NEUROPATHY: ICD-10-CM

## 2024-12-27 LAB
ALBUMIN SERPL-MCNC: 4.6 G/DL (ref 3.2–4.8)
ALBUMIN/GLOB SERPL: 1.4 {RATIO} (ref 1–2)
ALP LIVER SERPL-CCNC: 104 U/L
ALT SERPL-CCNC: 27 U/L
ANION GAP SERPL CALC-SCNC: 5 MMOL/L (ref 0–18)
AST SERPL-CCNC: 18 U/L (ref ?–34)
BASOPHILS # BLD AUTO: 0.14 X10(3) UL (ref 0–0.2)
BASOPHILS NFR BLD AUTO: 1.4 %
BILIRUB SERPL-MCNC: 0.2 MG/DL (ref 0.3–1.2)
BUN BLD-MCNC: 14 MG/DL (ref 9–23)
CALCIUM BLD-MCNC: 10.1 MG/DL (ref 8.7–10.4)
CHLORIDE SERPL-SCNC: 107 MMOL/L (ref 98–112)
CO2 SERPL-SCNC: 22 MMOL/L (ref 21–32)
CREAT BLD-MCNC: 1 MG/DL
EGFRCR SERPLBLD CKD-EPI 2021: 92 ML/MIN/1.73M2 (ref 60–?)
EOSINOPHIL # BLD AUTO: 0.68 X10(3) UL (ref 0–0.7)
EOSINOPHIL NFR BLD AUTO: 6.9 %
ERYTHROCYTE [DISTWIDTH] IN BLOOD BY AUTOMATED COUNT: 12.5 %
FASTING STATUS PATIENT QL REPORTED: NO
GLOBULIN PLAS-MCNC: 3.2 G/DL (ref 2–3.5)
GLUCOSE BLD-MCNC: 246 MG/DL (ref 70–99)
HCT VFR BLD AUTO: 50.5 %
HEMOGLOBIN A1C: 8.5 % (ref 4.3–5.6)
HGB BLD-MCNC: 17.2 G/DL
IMM GRANULOCYTES # BLD AUTO: 0.11 X10(3) UL (ref 0–1)
IMM GRANULOCYTES NFR BLD: 1.1 %
LYMPHOCYTES # BLD AUTO: 2.33 X10(3) UL (ref 1–4)
LYMPHOCYTES NFR BLD AUTO: 23.8 %
MCH RBC QN AUTO: 31.6 PG (ref 26–34)
MCHC RBC AUTO-ENTMCNC: 34.1 G/DL (ref 31–37)
MCV RBC AUTO: 92.7 FL
MONOCYTES # BLD AUTO: 0.86 X10(3) UL (ref 0.1–1)
MONOCYTES NFR BLD AUTO: 8.8 %
NEUTROPHILS # BLD AUTO: 5.67 X10 (3) UL (ref 1.5–7.7)
NEUTROPHILS # BLD AUTO: 5.67 X10(3) UL (ref 1.5–7.7)
NEUTROPHILS NFR BLD AUTO: 58 %
OSMOLALITY SERPL CALC.SUM OF ELEC: 287 MOSM/KG (ref 275–295)
PLATELET # BLD AUTO: 258 10(3)UL (ref 150–450)
POTASSIUM SERPL-SCNC: 4 MMOL/L (ref 3.5–5.1)
PROT SERPL-MCNC: 7.8 G/DL (ref 5.7–8.2)
RBC # BLD AUTO: 5.45 X10(6)UL
SODIUM SERPL-SCNC: 134 MMOL/L (ref 136–145)
TSI SER-ACNC: 0.74 UIU/ML (ref 0.55–4.78)
WBC # BLD AUTO: 9.8 X10(3) UL (ref 4–11)

## 2024-12-27 PROCEDURE — 85025 COMPLETE CBC W/AUTO DIFF WBC: CPT

## 2024-12-27 PROCEDURE — 80053 COMPREHEN METABOLIC PANEL: CPT

## 2024-12-27 PROCEDURE — 84402 ASSAY OF FREE TESTOSTERONE: CPT

## 2024-12-27 PROCEDURE — 36415 COLL VENOUS BLD VENIPUNCTURE: CPT

## 2024-12-27 PROCEDURE — 84443 ASSAY THYROID STIM HORMONE: CPT

## 2024-12-27 PROCEDURE — 84403 ASSAY OF TOTAL TESTOSTERONE: CPT

## 2024-12-27 RX ORDER — NAPROXEN 500 MG/1
500 TABLET ORAL 2 TIMES DAILY PRN
Qty: 60 TABLET | Refills: 1 | Status: SHIPPED | OUTPATIENT
Start: 2024-12-27

## 2024-12-27 RX ORDER — GABAPENTIN 300 MG/1
300 CAPSULE ORAL 3 TIMES DAILY
Qty: 90 CAPSULE | Refills: 1 | Status: SHIPPED | OUTPATIENT
Start: 2024-12-27

## 2024-12-27 RX ORDER — GABAPENTIN 600 MG/1
600 TABLET ORAL 3 TIMES DAILY
Qty: 90 TABLET | Refills: 0 | Status: SHIPPED | OUTPATIENT
Start: 2024-12-27

## 2024-12-27 RX ORDER — NAPROXEN 500 MG/1
500 TABLET ORAL 2 TIMES DAILY PRN
Qty: 60 TABLET | Refills: 1 | Status: CANCELLED | OUTPATIENT
Start: 2024-12-27

## 2024-12-27 RX ORDER — INSULIN LISPRO 200 [IU]/ML
6 INJECTION, SOLUTION SUBCUTANEOUS
Qty: 15 ML | Refills: 1 | Status: SHIPPED | OUTPATIENT
Start: 2024-12-27

## 2024-12-27 NOTE — PROGRESS NOTES
Subjective:      Chief Complaint   Patient presents with    Diabetes     Diabetes F/U. Wants me on the [employer]    Skin Irritation     Starts from umbilical area down to groin    Other     Bilateral feet pain    Vaccinations     Flu Shot     HISTORY OF PRESENT ILLNESS  HPI  HPI obtained per patient report.  Cristiano Hicks is a pleasant 49 year old male presenting for follow-up.   His blood sugars have been elevated, sometimes up to the 200s over the past several weeks, associated with subjective intermittent fever, fatigue, nausea, and diarrhea.   Prior to the past few weeks, he states that his blood sugar levels were close to goal.   He partially ran out of his gabapentin prescription, so has only been taking 600 mg TID. His neuropathy pain has been flaring up. He requests medication refills and a second opinion from a podiatrist. He notes that naproxen has been helping with his foot pain and has not been bothering his stomach.   He is also here for his flu shot today.    PAST PATIENT HISTORY  Past Medical History:    Back pain    Decorative tattoo    Diabetes (HCC)    High blood pressure    Hyperlipidemia    Pain in joints    Wears glasses     Past Surgical History:   Procedure Laterality Date    Colonoscopy      Colonoscopy N/A 02/08/2018    Procedure: COLONOSCOPY, POSSIBLE BIOPSY, POSSIBLE POLYPECTOMY 06676;  Surgeon: Donavan Varela MD;  [facility]: [facility]    Colostomy      Due to diverticulitis    Hernia surgery      Other surgical history      Skin surgery      Vasectomy  1997       CURRENT MEDICATIONS  Medications Taking[1]    HEALTH MAINTENANCE  Immunization History   Administered Date(s) Administered    FLU VAC QIV SPLIT 3 YRS AND OLDER (34615) 11/04/2019, 01/04/2021    Influenza Vaccine, trivalent (IIV3), PF 0.5mL (31611) 12/27/2024    Pneumovax 23 05/04/2018    TDAP 07/21/2015       ALLERGIES AND DRUG REACTIONS  Allergies[2]    Family History   Problem Relation Age of  Onset    Diabetes Father     Hypertension Mother     No Known Problems Sister     Colon Cancer Neg     Prostate Cancer Neg      Social History     Socioeconomic History    Marital status:    Occupational History    Occupation: Sales      Employer: [employer]   Tobacco Use    Smoking status: Every Day     Current packs/day: 1.00     Average packs/day: 1 pack/day for 20.0 years (20.0 ttl pk-yrs)     Types: Cigarettes     Passive exposure: Never    Smokeless tobacco: Never    Tobacco comments:     Patient not interested in counseling at this time   Vaping Use    Vaping status: Never Used   Substance and Sexual Activity    Alcohol use: Yes     Alcohol/week: 12.0 standard drinks of alcohol     Types: 12 Cans of beer per week    Drug use: Not Currently     Types: Cannabis    Sexual activity: Yes     Partners: Female   Other Topics Concern    Caffeine Concern No    Exercise No    Seat Belt No    Special Diet No    Stress Concern No    Weight Concern No       Review of Systems   Constitutional:  Positive for fatigue and fever.   Gastrointestinal:  Positive for diarrhea and nausea.   All other systems reviewed and are negative.         Objective:      /76 (BP Location: Left arm, Patient Position: Sitting, Cuff Size: adult)   Pulse 89   Temp 97.2 °F (36.2 °C) (Temporal)   Resp 16   Ht 5' 9\" (1.753 m)   Wt 200 lb (90.7 kg)   SpO2 97%   BMI 29.53 kg/m²   Body mass index is 29.53 kg/m².    Physical Exam  Vitals reviewed.   Constitutional:       General: He is not in acute distress.     Appearance: He is not ill-appearing, toxic-appearing or diaphoretic.   HENT:      Head: Normocephalic and atraumatic.   Eyes:      General: No scleral icterus.        Right eye: No discharge.         Left eye: No discharge.      Extraocular Movements: Extraocular movements intact.      Conjunctiva/sclera: Conjunctivae normal.   Cardiovascular:      Rate and Rhythm: Normal rate.   Pulmonary:      Effort: Pulmonary effort  is normal.   Abdominal:      General: Abdomen is flat. There is no distension.      Palpations: There is no mass.      Tenderness: There is abdominal tenderness (mild LLQ tenderness). There is no guarding or rebound.      Hernia: No hernia is present.   Musculoskeletal:      Cervical back: Neck supple.      Right lower leg: No edema.      Left lower leg: No edema.   Neurological:      Mental Status: He is alert and oriented to person, place, and time.   Psychiatric:         Mood and Affect: Mood normal.            Assessment and Plan:      1. Type 2 diabetes mellitus with hyperglycemia, with long-term current use of insulin (HCC) (Primary)  -     Gabapentin; Take 1 capsule (300 mg total) by mouth 3 (three) times daily.  Dispense: 90 capsule; Refill: 1  -     Gabapentin; Take 1 tablet (600 mg total) by mouth 3 (three) times daily.  Dispense: 90 tablet; Refill: 0  -     POC Hemoglobin A1C  -     HumaLOG KwikPen; Inject 6 Units into the skin 2 (two) times daily before meals. Inject 6 Units into the skin 2 (two) times daily before meals.Inject 6 Units into the skin 2 (two) times daily before meals.  Dispense: 15 mL; Refill: 1  -     Insulin Glargine (1 Unit Dial); Inject 65 Units into the skin every evening.  Dispense: 30 mL; Refill: 3  -     Hemoglobin A1C; Future; Expected date: 03/27/2025  2. Mixed hyperlipidemia  -     Lipid Panel; Future; Expected date: 03/27/2025  3. Neuropathy  -     Gabapentin; Take 1 capsule (300 mg total) by mouth 3 (three) times daily.  Dispense: 90 capsule; Refill: 1  -     Gabapentin; Take 1 tablet (600 mg total) by mouth 3 (three) times daily.  Dispense: 90 tablet; Refill: 0  -     Podiatry Referral  4. Chronic right-sided low back pain without sciatica  -     Naproxen; Take 1 tablet (500 mg total) by mouth 2 (two) times daily as needed (pain). Take with food.  Dispense: 60 tablet; Refill: 1  5. Fatigue, unspecified type  -     CBC With Differential With Platelet; Future; Expected date:  12/27/2024  -     Comp Metabolic Panel (14); Future; Expected date: 12/27/2024  -     Testosterone, Total And Free; Future; Expected date: 12/27/2024  -     TSH W Reflex To Free T4; Future; Expected date: 12/27/2024  6. Fever, unspecified fever cause  -     CBC With Differential With Platelet; Future; Expected date: 12/27/2024  -     Comp Metabolic Panel (14); Future; Expected date: 12/27/2024  -     CT ABDOMEN+PELVIS(CPT=74176); Future; Expected date: 12/27/2024  7. Left lower quadrant abdominal tenderness without rebound tenderness  -     CT ABDOMEN+PELVIS(CPT=74176); Future; Expected date: 12/27/2024  8. Colon cancer screening  -     GASTRO - INTERNAL  9. Need for vaccination  -     INFLUENZA VACCINE, TRI, PRESERV FREE, 0.5 ML    Return in about 3 months (around 3/27/2025) for follow-up.    DM2 with HLD  - complicated by diabetic neuropathy  - his A1C is 8.5 today, mildly increased from his previous of 8. Elevated BG levels over the past several weeks may be due to underlying infection including possible diverticulitis. Recommended labs and CT abdomen/pelvis within the next few days for further evaluation  - continue insulin glargine dose to 60 units at bedtime  - continue short-acting insulin lispro 6 units before meals  - continue checking BG 3-4 times daily   - unable to tolerate jardiance, metformin, and metformin ER due to adverse effects  - recommended annual diabetic eye exam to R/O contraindications to starting semaglutide  - resume gabapentin 900 mg TID  - recommended fasting labs in 3 months and follow-up afterwards    Patient verbalized understanding of assessment and recommendations. All questions and concerns were addressed.    Electronically signed by Mirella Green MD         [1]   Outpatient Medications Marked as Taking for the 12/27/24 encounter (Office Visit) with Mirella Green MD   Medication Sig Dispense Refill    gabapentin 300 MG Oral Cap Take 1 capsule (300 mg total) by mouth 3 (three) times  daily. 90 capsule 1    gabapentin 600 MG Oral Tab Take 1 tablet (600 mg total) by mouth 3 (three) times daily. 90 tablet 0    Insulin Lispro (HUMALOG KWIKPEN) 200 UNIT/ML Subcutaneous Solution Pen-injector Inject 6 Units into the skin 2 (two) times daily before meals. Inject 6 Units into the skin 2 (two) times daily before meals.Inject 6 Units into the skin 2 (two) times daily before meals. 15 mL 1    Insulin Glargine, 1 Unit Dial, 300 UNIT/ML Subcutaneous Solution Pen-injector Inject 65 Units into the skin every evening. 30 mL 3    naproxen 500 MG Oral Tab Take 1 tablet (500 mg total) by mouth 2 (two) times daily as needed (pain). Take with food. 60 tablet 1    Insulin Pen Needle (CARETOUCH PEN NEEDLES) 32G X 4 MM Does not apply Misc Use as directed to inject insulin 3 times daily 400 each 3    Continuous Blood Gluc Sensor (FREESTYLE ED 2 SENSOR) Does not apply Misc 1 each every 14 (fourteen) days. 8 each 1    Glucose Blood (BLOOD GLUCOSE TEST) In Vitro Strip Inject 1 strip into the skin in the morning, at noon, and at bedtime. 300 strip 3    Continuous Blood Gluc Sensor (FREESTYLE ED 2 SENSOR) Does not apply Misc 1 each every 14 (fourteen) days. 1 each 0    Lancets 33G Does not apply Misc 1 Lancet by Finger stick route daily. 100 each 0    Blood Glucose Monitoring Suppl (BLOOD GLUCOSE MONITOR SYSTEM) w/Device Does not apply Kit Check blood sugar daily 1 kit 0    Glucose Blood (ONETOUCH ULTRA BLUE) In Vitro Strip TEST DAILY AS NEEDED 100 strip 5    Glucose Blood (FREESTYLE TEST) In Vitro Strip Check sugar once daily as needed 100 strip 11   [2]   Allergies  Allergen Reactions    Tramadol NAUSEA AND VOMITING    Metformin DIARRHEA

## 2024-12-28 LAB
FREE TESTOST DIRECT: 6.3 PG/ML
TESTOSTERONE: 235 NG/DL

## 2024-12-30 ENCOUNTER — PATIENT MESSAGE (OUTPATIENT)
Dept: FAMILY MEDICINE CLINIC | Facility: CLINIC | Age: 49
End: 2024-12-30

## 2024-12-30 ENCOUNTER — HOSPITAL ENCOUNTER (OUTPATIENT)
Dept: CT IMAGING | Age: 49
Discharge: HOME | End: 2024-12-30
Attending: STUDENT IN AN ORGANIZED HEALTH CARE EDUCATION/TRAINING PROGRAM
Payer: COMMERCIAL

## 2024-12-30 DIAGNOSIS — R50.9 FEVER, UNSPECIFIED FEVER CAUSE: ICD-10-CM

## 2024-12-30 DIAGNOSIS — R79.89 LOW TESTOSTERONE: Primary | ICD-10-CM

## 2024-12-30 DIAGNOSIS — R10.814 LEFT LOWER QUADRANT ABDOMINAL TENDERNESS WITHOUT REBOUND TENDERNESS: ICD-10-CM

## 2024-12-30 PROCEDURE — 74176 CT ABD & PELVIS W/O CONTRAST: CPT | Performed by: STUDENT IN AN ORGANIZED HEALTH CARE EDUCATION/TRAINING PROGRAM

## 2024-12-31 NOTE — PROGRESS NOTES
Mauro Quinonez,     Your CT scan of the abdomen was overall benign. You had signs of fatty liver for which weight loss is recommended for treatment, but there were no findings to explain your symptoms. Please see a gastroenterologist for further evaluation including likely endoscopy. If your symptoms change or if you are unable to see a specialist within 1-2 weeks, please let me know.     Dr. Green

## 2025-01-02 ENCOUNTER — TELEPHONE (OUTPATIENT)
Dept: ORTHOPEDICS CLINIC | Facility: CLINIC | Age: 50
End: 2025-01-02

## 2025-01-02 RX ORDER — TESTOSTERONE CYPIONATE 200 MG/ML
150 INJECTION, SOLUTION INTRAMUSCULAR
Qty: 10 ML | Refills: 0 | Status: SHIPPED | OUTPATIENT
Start: 2025-01-02

## 2025-01-02 NOTE — TELEPHONE ENCOUNTER
[facility] No x-rays in Epic. Please advise if imaging is needed.  Future Appointments   Date Time Provider Department Center   1/8/2025 11:00 AM Annette Calixto DPM [facility]

## 2025-01-08 ENCOUNTER — OFFICE VISIT (OUTPATIENT)
Dept: ORTHOPEDICS CLINIC | Facility: CLINIC | Age: 50
End: 2025-01-08
Payer: COMMERCIAL

## 2025-01-08 VITALS — BODY MASS INDEX: 29.62 KG/M2 | WEIGHT: 200 LBS | HEIGHT: 69 IN

## 2025-01-08 DIAGNOSIS — E11.49 DM (DIABETES MELLITUS), TYPE 2 WITH NEUROLOGICAL COMPLICATIONS (HCC): Primary | ICD-10-CM

## 2025-01-08 DIAGNOSIS — F17.200 CURRENT EVERY DAY SMOKER: ICD-10-CM

## 2025-01-08 DIAGNOSIS — L90.9 PLANTAR FAT PAD ATROPHY: ICD-10-CM

## 2025-01-08 PROCEDURE — 3008F BODY MASS INDEX DOCD: CPT | Performed by: PODIATRIST

## 2025-01-08 PROCEDURE — 99204 OFFICE O/P NEW MOD 45 MIN: CPT | Performed by: PODIATRIST

## 2025-01-08 NOTE — PROGRESS NOTES
[facility] New Patient Note    CC:   Chief Complaint   Patient presents with    Foot Pain     Bi-Lateral Foot Pain, right is slightly worse  Onset: 2+ years  Pain Score: Right Foot 3, Left 3 constant ache       HPI: The patient is a 49 year old male who presents today with complaints of foot pain bilateral  Hx of DM, takes insulin      Forefoot feels being scrunched  Ball of foot  Toes feel numb      Was on gabapentin  Kind of helped but is not sure he wants to be on it    Feet cold  Smoker for years  States his diabetes is pretty good - states better control of glucose  Takes ibsulin      Swelling?  On feet a lot and also drives a lot    Hx of low back pain and \"disc issues\"      Past Medical History:    Back pain    Decorative tattoo    Diabetes (HCC)    High blood pressure    Hyperlipidemia    ASTUDILLO (nonalcoholic steatohepatitis)    Pain in joints    Wears glasses     Past Surgical History:   Procedure Laterality Date    Colonoscopy      Colonoscopy N/A 02/08/2018    Procedure: COLONOSCOPY, POSSIBLE BIOPSY, POSSIBLE POLYPECTOMY 48745;  Surgeon: Donavan Varela MD;  [facility]    Colostomy      Due to diverticulitis    Hernia surgery      Other surgical history      Skin surgery      Vasectomy  1997     Current Outpatient Medications   Medication Sig Dispense Refill    testosterone cypionate 200 mg/mL Intramuscular Solution Inject 0.75 mL (150 mg total) into the muscle every 14 (fourteen) days. 10 mL 0    Syringe/Needle, Disp, 18G X 1\" 3 ML Does not apply Misc Use as directed to inject testosterone into muscle every 2 weeks 100 each 0    gabapentin 300 MG Oral Cap Take 1 capsule (300 mg total) by mouth 3 (three) times daily. 90 capsule 1    gabapentin 600 MG Oral Tab Take 1 tablet (600 mg total) by mouth 3 (three) times daily. 90 tablet 0    Insulin Lispro (HUMALOG KWIKPEN) 200 UNIT/ML Subcutaneous Solution Pen-injector Inject 6 Units into the skin 2 (two) times daily before  meals. Inject 6 Units into the skin 2 (two) times daily before meals.Inject 6 Units into the skin 2 (two) times daily before meals. 15 mL 1    Insulin Glargine, 1 Unit Dial, 300 UNIT/ML Subcutaneous Solution Pen-injector Inject 65 Units into the skin every evening. 30 mL 3    naproxen 500 MG Oral Tab Take 1 tablet (500 mg total) by mouth 2 (two) times daily as needed (pain). Take with food. 60 tablet 1    Insulin Pen Needle (CARETOUCH PEN NEEDLES) 32G X 4 MM Does not apply Misc Use as directed to inject insulin 3 times daily 400 each 3    Continuous Blood Gluc Sensor (FREESTYLE ED 2 SENSOR) Does not apply Misc 1 each every 14 (fourteen) days. 8 each 1    Glucose Blood (BLOOD GLUCOSE TEST) In Vitro Strip Inject 1 strip into the skin in the morning, at noon, and at bedtime. 300 strip 3    Continuous Blood Gluc Sensor (FREESTYLE ED 2 SENSOR) Does not apply Misc 1 each every 14 (fourteen) days. 1 each 0    Lancets 33G Does not apply Misc 1 Lancet by Finger stick route daily. 100 each 0    Blood Glucose Monitoring Suppl (BLOOD GLUCOSE MONITOR SYSTEM) w/Device Does not apply Kit Check blood sugar daily 1 kit 0    ONETOUCH DELICA LANCETS 33G Does not apply Misc TEST DAILY AS NEEDED 100 each 5    Glucose Blood (ONETOUCH ULTRA BLUE) In Vitro Strip TEST DAILY AS NEEDED 100 strip 5    Glucose Blood (FREESTYLE TEST) In Vitro Strip Check sugar once daily as needed 100 strip 11    pantoprazole 40 MG Oral Tab EC Take one tablet (40 mg total) by mouth once daily, 30 minutes prior to breakfast. (Patient not taking: Reported on 12/27/2024) 90 tablet 3    Naloxone HCl 4 MG/0.1ML Nasal Liquid 4 mg by Nasal route as needed. If patient remains unresponsive, repeat dose in other nostril 2-5 minutes after first dose. (Patient not taking: Reported on 12/27/2024) 1 kit 0     Allergies[1]  Family History   Problem Relation Age of Onset    Diabetes Father     Hypertension Mother     No Known Problems Sister     Colon Cancer Neg      Prostate Cancer Neg      Social History     Occupational History    Occupation: [employer]      Employer: [employer]   Tobacco Use    Smoking status: Every Day     Current packs/day: 1.00     Average packs/day: 1 pack/day for 20.0 years (20.0 ttl pk-yrs)     Types: Cigarettes     Passive exposure: Never    Smokeless tobacco: Never    Tobacco comments:     Patient not interested in counseling at this time   Vaping Use    Vaping status: Never Used   Substance and Sexual Activity    Alcohol use: Yes     Alcohol/week: 12.0 standard drinks of alcohol     Types: 12 Cans of beer per week    Drug use: Not Currently     Types: Cannabis    Sexual activity: Yes     Partners: Female        ROS:  Complete ROS reviewed by me and non-contributory to the chief complaint except as mentioned above.    Physical Exam:    Ht 5' 9\" (1.753 m)   Wt 200 lb (90.7 kg)   BMI 29.53 kg/m²       Neutral arches upon stance  No calluses.  Nails normal  Thinning of the metatarsal fat pad and shifting into the distal sulcus.  Toes are pretty rectus without deformity  Feet are warm and well-perfused with hair growth and also palpable 2/4 DP and PT pulses  He can feel light touch to the tips of the toes and interdigitally.  He does not have any areas of coolness and he states his feet feel cool.  Feet are palpable warm.  Strength is 5 out of 5 all muscle groups  Sensation is intact sharp versus dull.  He can feel light touch to the tips of the toes  Palpable pedal pulses.  Hair growth is present.  Skin is supple and feet are well perfused and warm.    Strength is 5 out of 5 all muscle groups    Full range of motion and nonpainful motion of the ankle joint, subtalar joint, MP joints, and midfoot joints.       Imaging: Will obtain at next visit      Assessment/Diagnoses:  Diagnoses and all orders for this visit:    DM (diabetes mellitus), type 2 with neurological complications (HCC)    Plantar fat pad atrophy    Current every day smoker        Plan:   I reviewed imaging and exam findings with the patient.  No xrays today  Possibly at yearly diabetic foot exam check  Is recommended he sees me yearly to check his feet.    We discussed the fat pad atrophy of the metatarsal heads and also diabetic footcare      We discussed different possibilities for the origin of his neuropathy.  Primarily and most likely is diabetes with insulin use.  We talked about what diabetic neuropathy is and the treatment plan.  Also neuropathy could be related to his disc issue low back pain.  Lastly some of the foot pain could be ischemic in nature from longstanding smoking but his pulses are fairly good 2 out of 4 bilateral and we discussed these findings.    Discussed smoking cessation and also keeping his blood sugar under control, long-term this is much healthier for the feet and he is less likely to have complications.  We could always obtain a baseline HEIDI study for blood flow but at this time his pulses are pretty good    Compounding med  It sounds like he is not happy with the gabapentin so we could try compounding medication and gabapentin can be part of that.  We will put through a mixture for different symptoms and he would use this 3-4 times a day and it takes about 2 to 3 weeks to have an effect if it is going to help.  It can always be increased in strength as well.  Compounding medication usually is not covered by insurance.  He needs to follow-up with his regular doctor for discussion of alternatives to gabapentin if he wants to be on something oral.  The compounding med is a good start.    We also discussed shoe types and what he should be in as well as arch support.  He feels restrictive in shoes and feels better in his slides.  I did recommend at least the [place] type slides that have a little more stiffness and support as well as arch support.      Compression socks for help with pain and standing all day        Annette Calixto, DPMPodiatric Surgery  FACFAS  [facility]  [address], [address], [place] [address]   [address] [place] [address] [email or web address]  [email or web address]  t: [phone]   f: [phone]              This document was partially prepared using Dragon Medical voice recognition software.            [1]   Allergies  Allergen Reactions    Tramadol NAUSEA AND VOMITING    Metformin DIARRHEA

## 2025-01-23 ENCOUNTER — OFFICE VISIT (OUTPATIENT)
Dept: FAMILY MEDICINE CLINIC | Facility: CLINIC | Age: 50
End: 2025-01-23
Payer: COMMERCIAL

## 2025-01-23 ENCOUNTER — HOSPITAL ENCOUNTER (OUTPATIENT)
Dept: GENERAL RADIOLOGY | Age: 50
Discharge: HOME | End: 2025-01-23
Attending: STUDENT IN AN ORGANIZED HEALTH CARE EDUCATION/TRAINING PROGRAM
Payer: COMMERCIAL

## 2025-01-23 VITALS
DIASTOLIC BLOOD PRESSURE: 92 MMHG | SYSTOLIC BLOOD PRESSURE: 142 MMHG | BODY MASS INDEX: 29.47 KG/M2 | RESPIRATION RATE: 19 BRPM | WEIGHT: 199 LBS | HEIGHT: 69 IN | TEMPERATURE: 97 F | OXYGEN SATURATION: 98 % | HEART RATE: 86 BPM

## 2025-01-23 DIAGNOSIS — G89.29 CHRONIC LOW BACK PAIN, UNSPECIFIED BACK PAIN LATERALITY, UNSPECIFIED WHETHER SCIATICA PRESENT: Primary | ICD-10-CM

## 2025-01-23 DIAGNOSIS — R79.89 LOW TESTOSTERONE: ICD-10-CM

## 2025-01-23 DIAGNOSIS — G89.29 CHRONIC LOW BACK PAIN, UNSPECIFIED BACK PAIN LATERALITY, UNSPECIFIED WHETHER SCIATICA PRESENT: ICD-10-CM

## 2025-01-23 DIAGNOSIS — M54.50 CHRONIC LOW BACK PAIN, UNSPECIFIED BACK PAIN LATERALITY, UNSPECIFIED WHETHER SCIATICA PRESENT: ICD-10-CM

## 2025-01-23 DIAGNOSIS — M54.50 CHRONIC LOW BACK PAIN, UNSPECIFIED BACK PAIN LATERALITY, UNSPECIFIED WHETHER SCIATICA PRESENT: Primary | ICD-10-CM

## 2025-01-23 PROCEDURE — 3008F BODY MASS INDEX DOCD: CPT | Performed by: STUDENT IN AN ORGANIZED HEALTH CARE EDUCATION/TRAINING PROGRAM

## 2025-01-23 PROCEDURE — 72110 X-RAY EXAM L-2 SPINE 4/>VWS: CPT | Performed by: STUDENT IN AN ORGANIZED HEALTH CARE EDUCATION/TRAINING PROGRAM

## 2025-01-23 PROCEDURE — 99213 OFFICE O/P EST LOW 20 MIN: CPT | Performed by: STUDENT IN AN ORGANIZED HEALTH CARE EDUCATION/TRAINING PROGRAM

## 2025-01-23 PROCEDURE — 96372 THER/PROPH/DIAG INJ SC/IM: CPT | Performed by: STUDENT IN AN ORGANIZED HEALTH CARE EDUCATION/TRAINING PROGRAM

## 2025-01-23 PROCEDURE — 3080F DIAST BP >= 90 MM HG: CPT | Performed by: STUDENT IN AN ORGANIZED HEALTH CARE EDUCATION/TRAINING PROGRAM

## 2025-01-23 PROCEDURE — 3077F SYST BP >= 140 MM HG: CPT | Performed by: STUDENT IN AN ORGANIZED HEALTH CARE EDUCATION/TRAINING PROGRAM

## 2025-01-23 RX ORDER — TESTOSTERONE CYPIONATE 200 MG/ML
150 INJECTION, SOLUTION INTRAMUSCULAR ONCE
Status: COMPLETED | OUTPATIENT
Start: 2025-01-23 | End: 2025-01-23

## 2025-01-23 RX ADMIN — TESTOSTERONE CYPIONATE 150 MG: 200 INJECTION, SOLUTION INTRAMUSCULAR at 11:25:00

## 2025-01-23 NOTE — PROGRESS NOTES
Subjective:      Chief Complaint   Patient presents with    Back Pain     Possible pinched nerve - would like to get referral     Vaccinations     Testosterone injection     HISTORY OF PRESENT ILLNESS  HPI  HPI obtained per patient report.  Cristiano Hicks is a pleasant 49 year old male presenting for follow-up.   He does not feel comfortable administering his first IM testosterone injection himself, so he requests to be shown correct administration technique today.   He consulted with a podiatrist for his foot pain, who suggested that his pain may have a lumbar radiculopathy component. He does have chronic intermittent LBP but has not noticed radiation into his LE's per se.    PAST PATIENT HISTORY  Past Medical History:    Abdominal pain    Back pain    Bloating    Constipation    Decorative tattoo    Diabetes (HCC)    Frequent urination    Heartburn    High blood pressure    Hyperlipidemia    ASTUDILLO (nonalcoholic steatohepatitis)    Pain in joints    Problems with swallowing    Sleep disturbance    Wears glasses     Past Surgical History:   Procedure Laterality Date    Colonoscopy      Colonoscopy N/A 02/08/2018    Procedure: COLONOSCOPY, POSSIBLE BIOPSY, POSSIBLE POLYPECTOMY 90997;  Surgeon: Donavan Varela MD;  [facility]    Colostomy      Due to diverticulitis    Hernia surgery      Other surgical history      Skin surgery      Vasectomy  1997       CURRENT MEDICATIONS  Medications Taking[1]    HEALTH MAINTENANCE  Immunization History   Administered Date(s) Administered    FLU VAC QIV SPLIT 3 YRS AND OLDER (50986) 11/04/2019, 01/04/2021    Influenza Vaccine, trivalent (IIV3), PF 0.5mL (63125) 12/27/2024    Pneumovax 23 05/04/2018    TDAP 07/21/2015       ALLERGIES AND DRUG REACTIONS  Allergies[2]    Family History   Problem Relation Age of Onset    Diabetes Father     Hypertension Mother     No Known Problems Sister     Colon Cancer Neg     Prostate Cancer Neg      Social History      Socioeconomic History    Marital status:    Occupational History    Occupation: Sales      Employer: [employer]   Tobacco Use    Smoking status: Every Day     Current packs/day: 1.00     Average packs/day: 1 pack/day for 20.0 years (20.0 ttl pk-yrs)     Types: Cigarettes     Passive exposure: Never    Smokeless tobacco: Never    Tobacco comments:     Patient not interested in counseling at this time   Vaping Use    Vaping status: Never Used   Substance and Sexual Activity    Alcohol use: Yes     Alcohol/week: 12.0 standard drinks of alcohol     Types: 12 Cans of beer per week    Drug use: Not Currently     Types: Cannabis     Comment: last time 2 days ago- gummies for sleep    Sexual activity: Yes     Partners: Female   Other Topics Concern    Caffeine Concern No    Exercise No    Seat Belt No    Special Diet No    Stress Concern No    Weight Concern No       Review of Systems   All other systems reviewed and are negative.         Objective:      BP (!) 142/92   Pulse 86   Temp 97.3 °F (36.3 °C) (Temporal)   Resp 19   Ht 5' 9\" (1.753 m)   Wt 199 lb (90.3 kg)   SpO2 98%   BMI 29.39 kg/m²   Body mass index is 29.39 kg/m².    Physical Exam  Vitals reviewed.   Constitutional:       General: He is not in acute distress.     Appearance: He is not ill-appearing or toxic-appearing.   HENT:      Head: Normocephalic and atraumatic.   Cardiovascular:      Rate and Rhythm: Normal rate.   Pulmonary:      Effort: Pulmonary effort is normal.   Musculoskeletal:      Cervical back: Neck supple.      Right lower leg: No edema.      Left lower leg: No edema.   Neurological:      Mental Status: He is alert and oriented to person, place, and time.            Assessment and Plan:      1. Chronic low back pain, unspecified back pain laterality, unspecified whether sciatica present (Primary)  -     ORTHOPEDIC - INTERNAL  -     XR LUMBAR SPINE (MIN 2 VIEWS) (CPT=72100); Future; Expected date: 01/23/2025  2. Low  testosterone    No follow-ups on file.    LBP  - will obtain lumbar spine x-ray and refer to Ortho for consultation     Low testosterone  - first dose of IM testosterone was administered in the office today   - if he does not feel comfortable administering his injections at home, he may return every 2 weeks for an RN appointment for his testosterone doses    Patient verbalized understanding of assessment and recommendations. All questions and concerns were addressed.    Electronically signed by Mirella Green MD         [1]   Outpatient Medications Marked as Taking for the 1/23/25 encounter (Office Visit) with Mirella Green MD   Medication Sig Dispense Refill    testosterone cypionate 200 mg/mL Intramuscular Solution Inject 0.75 mL (150 mg total) into the muscle every 14 (fourteen) days. 10 mL 0    Syringe/Needle, Disp, 18G X 1\" 3 ML Does not apply Misc Use as directed to inject testosterone into muscle every 2 weeks 100 each 0    gabapentin 300 MG Oral Cap Take 1 capsule (300 mg total) by mouth 3 (three) times daily. 90 capsule 1    gabapentin 600 MG Oral Tab Take 1 tablet (600 mg total) by mouth 3 (three) times daily. 90 tablet 0    Insulin Lispro (HUMALOG KWIKPEN) 200 UNIT/ML Subcutaneous Solution Pen-injector Inject 6 Units into the skin 2 (two) times daily before meals. Inject 6 Units into the skin 2 (two) times daily before meals.Inject 6 Units into the skin 2 (two) times daily before meals. 15 mL 1    Insulin Glargine, 1 Unit Dial, 300 UNIT/ML Subcutaneous Solution Pen-injector Inject 65 Units into the skin every evening. 30 mL 3    naproxen 500 MG Oral Tab Take 1 tablet (500 mg total) by mouth 2 (two) times daily as needed (pain). Take with food. 60 tablet 1    Insulin Pen Needle (CARETOUCH PEN NEEDLES) 32G X 4 MM Does not apply Misc Use as directed to inject insulin 3 times daily 400 each 3    Continuous Blood Gluc Sensor (FREESTYLE ED 2 SENSOR) Does not apply Misc 1 each every 14 (fourteen) days. 8 each 1     Glucose Blood (BLOOD GLUCOSE TEST) In Vitro Strip Inject 1 strip into the skin in the morning, at noon, and at bedtime. 300 strip 3    Continuous Blood Gluc Sensor (FREESTYLE ED 2 SENSOR) Does not apply Misc 1 each every 14 (fourteen) days. 1 each 0    Lancets 33G Does not apply Misc 1 Lancet by Finger stick route daily. 100 each 0    Blood Glucose Monitoring Suppl (BLOOD GLUCOSE MONITOR SYSTEM) w/Device Does not apply Kit Check blood sugar daily 1 kit 0    ONETOUCH DELICA LANCETS 33G Does not apply Misc TEST DAILY AS NEEDED 100 each 5    Glucose Blood (ONETOUCH ULTRA BLUE) In Vitro Strip TEST DAILY AS NEEDED 100 strip 5    Glucose Blood (FREESTYLE TEST) In Vitro Strip Check sugar once daily as needed 100 strip 11   [2]   Allergies  Allergen Reactions    Tramadol NAUSEA AND VOMITING    Metformin DIARRHEA

## 2025-01-23 NOTE — PROGRESS NOTES
Mauro Quinonez,     Your lumbar spine x-ray showed stable arthritis and disc findings. If you would like to start physical therapy while waiting for your Orthopedic consultation, please let me know and we can refer you.     Dr. Green

## 2025-02-17 ENCOUNTER — HOSPITAL ENCOUNTER (OUTPATIENT)
Dept: MRI IMAGING | Age: 50
Discharge: HOME | End: 2025-02-17
Attending: STUDENT IN AN ORGANIZED HEALTH CARE EDUCATION/TRAINING PROGRAM
Payer: COMMERCIAL

## 2025-02-17 DIAGNOSIS — R93.89 ABNORMAL CT SCAN: ICD-10-CM

## 2025-02-17 PROCEDURE — A9575 INJ GADOTERATE MEGLUMI 0.1ML: HCPCS | Performed by: STUDENT IN AN ORGANIZED HEALTH CARE EDUCATION/TRAINING PROGRAM

## 2025-02-17 PROCEDURE — 72197 MRI PELVIS W/O & W/DYE: CPT | Performed by: STUDENT IN AN ORGANIZED HEALTH CARE EDUCATION/TRAINING PROGRAM

## 2025-02-17 RX ORDER — GADOTERATE MEGLUMINE 376.9 MG/ML
20 INJECTION INTRAVENOUS
Status: COMPLETED | OUTPATIENT
Start: 2025-02-17 | End: 2025-02-17

## 2025-02-17 RX ADMIN — GADOTERATE MEGLUMINE 20 ML: 376.9 INJECTION INTRAVENOUS at 07:55:00

## 2025-02-17 NOTE — PROGRESS NOTES
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   However, given partial calcification, will refer to surgery. Dr Van to see if additional management evaluation needed.         ---    Mauro Quinonez,    Your recent MR shows that there are post-operative changes consistent with your prior colon surgery. In addition, there is an area of abnormality along the prior colon surgery, known as a fluid collection with partial calcification. This appears to have been there for at least 1 year, however, given the partial calcification, I recommend further evaluation with a surgeon. I have placed this referral for you.     Please keep your GI scheduled procedures for tomorrow, thank you.     Please let me know if you have any questions or concerns.     Sincerely,  Mack Lee MD

## 2025-02-18 PROBLEM — R10.32 LEFT LOWER QUADRANT PAIN: Status: ACTIVE | Noted: 2025-02-18

## 2025-02-18 PROBLEM — K21.00 GERD WITH ESOPHAGITIS: Status: ACTIVE | Noted: 2025-02-18

## 2025-02-18 PROBLEM — K59.00 CONSTIPATION: Status: ACTIVE | Noted: 2025-02-18

## 2025-02-18 PROBLEM — Z12.11 SPECIAL SCREENING FOR MALIGNANT NEOPLASM OF COLON: Status: ACTIVE | Noted: 2025-02-18

## 2025-02-18 PROBLEM — R13.10 DYSPHAGIA: Status: ACTIVE | Noted: 2025-02-18

## 2025-02-18 PROCEDURE — 88305 TISSUE EXAM BY PATHOLOGIST: CPT | Performed by: STUDENT IN AN ORGANIZED HEALTH CARE EDUCATION/TRAINING PROGRAM

## 2025-02-28 ENCOUNTER — PATIENT MESSAGE (OUTPATIENT)
Dept: FAMILY MEDICINE CLINIC | Facility: CLINIC | Age: 50
End: 2025-02-28

## 2025-02-28 DIAGNOSIS — G89.29 CHRONIC LOW BACK PAIN, UNSPECIFIED BACK PAIN LATERALITY, UNSPECIFIED WHETHER SCIATICA PRESENT: Primary | ICD-10-CM

## 2025-02-28 DIAGNOSIS — M54.50 CHRONIC LOW BACK PAIN, UNSPECIFIED BACK PAIN LATERALITY, UNSPECIFIED WHETHER SCIATICA PRESENT: Primary | ICD-10-CM

## 2025-02-28 NOTE — TELEPHONE ENCOUNTER
Mirella Green MD  1/23/2025  1:27 PM CST       Mauro Quinonez,     Your lumbar spine x-ray showed stable arthritis and disc findings. If you would like to start physical therapy while waiting for your Orthopedic consultation, please let me know and we can refer you.     Dr. Green     Order Placed

## 2025-03-19 DIAGNOSIS — R79.89 LOW TESTOSTERONE: ICD-10-CM

## 2025-03-19 DIAGNOSIS — G62.9 NEUROPATHY: ICD-10-CM

## 2025-03-19 DIAGNOSIS — Z79.4 TYPE 2 DIABETES MELLITUS WITH HYPERGLYCEMIA, WITH LONG-TERM CURRENT USE OF INSULIN (HCC): ICD-10-CM

## 2025-03-19 DIAGNOSIS — E11.65 TYPE 2 DIABETES MELLITUS WITH HYPERGLYCEMIA, WITH LONG-TERM CURRENT USE OF INSULIN (HCC): ICD-10-CM

## 2025-03-21 RX ORDER — GABAPENTIN 300 MG/1
300 CAPSULE ORAL 3 TIMES DAILY
Qty: 270 CAPSULE | Refills: 0 | Status: SHIPPED | OUTPATIENT
Start: 2025-03-21 | End: 2025-06-19

## 2025-03-21 RX ORDER — TESTOSTERONE CYPIONATE 200 MG/ML
150 INJECTION, SOLUTION INTRAMUSCULAR
Qty: 10 ML | Refills: 0 | Status: SHIPPED | OUTPATIENT
Start: 2025-03-21

## 2025-03-21 RX ORDER — GABAPENTIN 600 MG/1
600 TABLET ORAL 3 TIMES DAILY
Qty: 270 TABLET | Refills: 0 | Status: SHIPPED | OUTPATIENT
Start: 2025-03-21 | End: 2025-06-19

## 2025-03-21 NOTE — TELEPHONE ENCOUNTER
Requesting Gabapentin 300mg  Last Rx'd 12/27/24 #90 with 0 refills    Requesting Gabapentin 600mg  Last Rx'd 12/27/24 #90 with 0 refills    Requesting Testosterone 200mg  Last Rx'd 1/2/25 #10ml with 0 refills    Last OV: 1/23/25  RTC: not noted    Future Appointments   Date Time Provider Department Center   3/26/2025  8:00 AM Rudy Van MD [facility] [address]         Controlled med:  Rx pended and routed for approval/denial

## 2025-03-26 ENCOUNTER — LAB ENCOUNTER (OUTPATIENT)
Dept: LAB | Age: 50
End: 2025-03-26
Attending: STUDENT IN AN ORGANIZED HEALTH CARE EDUCATION/TRAINING PROGRAM
Payer: COMMERCIAL

## 2025-03-26 ENCOUNTER — OFFICE VISIT (OUTPATIENT)
Dept: SURGERY | Facility: CLINIC | Age: 50
End: 2025-03-26
Payer: COMMERCIAL

## 2025-03-26 VITALS
TEMPERATURE: 98 F | SYSTOLIC BLOOD PRESSURE: 150 MMHG | HEART RATE: 93 BPM | DIASTOLIC BLOOD PRESSURE: 85 MMHG | OXYGEN SATURATION: 96 % | RESPIRATION RATE: 18 BRPM

## 2025-03-26 DIAGNOSIS — R79.89 LOW TESTOSTERONE: ICD-10-CM

## 2025-03-26 DIAGNOSIS — E11.65 TYPE 2 DIABETES MELLITUS WITH HYPERGLYCEMIA, WITH LONG-TERM CURRENT USE OF INSULIN (HCC): ICD-10-CM

## 2025-03-26 DIAGNOSIS — E78.2 MIXED HYPERLIPIDEMIA: ICD-10-CM

## 2025-03-26 DIAGNOSIS — Z79.4 TYPE 2 DIABETES MELLITUS WITH HYPERGLYCEMIA, WITH LONG-TERM CURRENT USE OF INSULIN (HCC): ICD-10-CM

## 2025-03-26 DIAGNOSIS — E87.1 HYPONATREMIA: ICD-10-CM

## 2025-03-26 DIAGNOSIS — K40.90 RIGHT INGUINAL HERNIA: Primary | ICD-10-CM

## 2025-03-26 LAB
ANION GAP SERPL CALC-SCNC: 9 MMOL/L (ref 0–18)
BUN BLD-MCNC: 13 MG/DL (ref 9–23)
CALCIUM BLD-MCNC: 10.2 MG/DL (ref 8.7–10.6)
CHLORIDE SERPL-SCNC: 102 MMOL/L (ref 98–112)
CHOLEST SERPL-MCNC: 246 MG/DL (ref ?–200)
CO2 SERPL-SCNC: 26 MMOL/L (ref 21–32)
CREAT BLD-MCNC: 0.98 MG/DL
EGFRCR SERPLBLD CKD-EPI 2021: 94 ML/MIN/1.73M2 (ref 60–?)
ERYTHROCYTE [DISTWIDTH] IN BLOOD BY AUTOMATED COUNT: 13.1 %
EST. AVERAGE GLUCOSE BLD GHB EST-MCNC: 192 MG/DL (ref 68–126)
FASTING PATIENT LIPID ANSWER: NO
FASTING STATUS PATIENT QL REPORTED: NO
GLUCOSE BLD-MCNC: 177 MG/DL (ref 70–99)
HBA1C MFR BLD: 8.3 % (ref ?–5.7)
HCT VFR BLD AUTO: 52.3 %
HDLC SERPL-MCNC: 52 MG/DL (ref 40–59)
HGB BLD-MCNC: 18.4 G/DL
LDLC SERPL CALC-MCNC: 170 MG/DL (ref ?–100)
MCH RBC QN AUTO: 31.7 PG (ref 26–34)
MCHC RBC AUTO-ENTMCNC: 35.2 G/DL (ref 31–37)
MCV RBC AUTO: 90 FL
NONHDLC SERPL-MCNC: 194 MG/DL (ref ?–130)
OSMOLALITY SERPL CALC.SUM OF ELEC: 288 MOSM/KG (ref 275–295)
PLATELET # BLD AUTO: 259 10(3)UL (ref 150–450)
POTASSIUM SERPL-SCNC: 3.9 MMOL/L (ref 3.5–5.1)
RBC # BLD AUTO: 5.81 X10(6)UL
SODIUM SERPL-SCNC: 137 MMOL/L (ref 136–145)
TESTOST SERPL-MCNC: 844.28 NG/DL
TRIGL SERPL-MCNC: 135 MG/DL (ref 30–149)
VLDLC SERPL CALC-MCNC: 27 MG/DL (ref 0–30)
WBC # BLD AUTO: 9.8 X10(3) UL (ref 4–11)

## 2025-03-26 PROCEDURE — 36415 COLL VENOUS BLD VENIPUNCTURE: CPT

## 2025-03-26 PROCEDURE — 3079F DIAST BP 80-89 MM HG: CPT | Performed by: SURGERY

## 2025-03-26 PROCEDURE — 84403 ASSAY OF TOTAL TESTOSTERONE: CPT

## 2025-03-26 PROCEDURE — 99214 OFFICE O/P EST MOD 30 MIN: CPT | Performed by: SURGERY

## 2025-03-26 PROCEDURE — 80061 LIPID PANEL: CPT

## 2025-03-26 PROCEDURE — 85027 COMPLETE CBC AUTOMATED: CPT

## 2025-03-26 PROCEDURE — 3077F SYST BP >= 140 MM HG: CPT | Performed by: SURGERY

## 2025-03-26 PROCEDURE — 80048 BASIC METABOLIC PNL TOTAL CA: CPT

## 2025-03-26 PROCEDURE — 83036 HEMOGLOBIN GLYCOSYLATED A1C: CPT

## 2025-03-26 NOTE — H&P
New Patient Visit Note       Active Problems      1. Right inguinal hernia        Chief Complaint   Chief Complaint   Patient presents with    New Patient     NP- Fluid Collection at surgical site, colon resection around 25 years ago, MRI Pelvis on 2/17/25, reports irritation at right lower abdomen        History of Present Illness     The patient presents to request of his primary care physician and gastroenterologist for evaluation of a fluid collection near his colorectal anastomosis; surgery was performed over 20 years ago for perforated diverticulitis.  Patient had exploratory laparotomy, Deepti procedure and subsequently colostomy reversal by his report.  Recently the patient has been experiencing abdominal pain which led to imaging including CT scan of the abdomen pelvis, pelvic MRI and colonoscopy with EGD for workup.  Colonoscopy revealed rectal ulcer for which follow-up flex sig is planned with Dr. Lee.  CT scan of the abdomen pelvis reveals no acute process except multiple miscellaneous findings noted in the radiologist report.  Additionally there is a right, fat-containing inguinal hernia with correlation to the patient's other stated complaint of right inguinal pain.  Pelvic MRI and CT scan confirm a benign appearing fluid collection near the patient's prior anastomosis likely representing chronic seroma or lymphocele.  I overall concur with the radiologist report except that I identify a fat-containing right inguinal hernia which is not mentioned in the radiologist report; this is corroborated by my exam below.    The patient denies fever, chills, chest pain, shortness of breath, dyspnea. The patient also denies hematemesis, melena, or hematochezia. The patient denies change in bowel or bladder habits. There is no complaint of hematuria or dysuria.    I discussed the risk, benefits, alternatives of surgery.  I discussed the anticipated postoperative recovery including dietary, activity, exercise,  and lifestyle recommendations.  The patient's questions regarding surgical procedure were answered and the patient voiced understanding of the care plan.    Allergies  Cristiano is allergic to tramadol and metformin.    Past Medical / Surgical / Social / Family History    The past medical and past surgical history have been reviewed by me today.    Past Medical History:    Abdominal pain    Back pain    Bloating    Constipation    Decorative tattoo    Diabetes (HCC)    Frequent urination    Gastric polyp    Heartburn    High blood pressure    Hyperlipidemia    ASTUDILLO (nonalcoholic steatohepatitis)    Pain in joints    Problems with swallowing    Sleep disturbance    Wears glasses     Past Surgical History:   Procedure Laterality Date    Colonoscopy      Colonoscopy N/A 02/08/2018    Procedure: COLONOSCOPY, POSSIBLE BIOPSY, POSSIBLE POLYPECTOMY 54641;  Surgeon: Donavan Varela MD;  [facility] [facility]    Colostomy      Due to diverticulitis    Hernia surgery      Other surgical history      Skin surgery      Vasectomy  1997       The family history and social history have been reviewed by me today.    Family History   Problem Relation Age of Onset    Diabetes Father     Hypertension Mother     No Known Problems Sister     Colon Cancer Neg     Prostate Cancer Neg      Social History     Socioeconomic History    Marital status:    Occupational History    Occupation: Sales      Employer: [employer]   Tobacco Use    Smoking status: Every Day     Current packs/day: 1.00     Average packs/day: 1 pack/day for 20.0 years (20.0 ttl pk-yrs)     Types: Cigarettes     Passive exposure: Never    Smokeless tobacco: Never    Tobacco comments:     Patient not interested in counseling at this time   Vaping Use    Vaping status: Never Used   Substance and Sexual Activity    Alcohol use: Yes     Alcohol/week: 12.0 standard drinks of alcohol     Types: 12 Cans of beer per week    Drug use: Not Currently     Types:  Cannabis     Comment: last time 2 days ago- gummies for sleep    Sexual activity: Yes     Partners: Female   Other Topics Concern    Caffeine Concern No    Exercise No    Seat Belt No    Special Diet No    Stress Concern No    Weight Concern No        Current Outpatient Medications:     gabapentin 300 MG Oral Cap, Take 1 capsule (300 mg total) by mouth 3 (three) times daily., Disp: 270 capsule, Rfl: 0    gabapentin 600 MG Oral Tab, Take 1 tablet (600 mg total) by mouth 3 (three) times daily., Disp: 270 tablet, Rfl: 0    testosterone cypionate 200 mg/mL Intramuscular Solution, Inject 0.75 mL (150 mg total) into the muscle every 14 (fourteen) days., Disp: 10 mL, Rfl: 0    PEG 3350-KCl-Na Bicarb-NaCl 420 g Oral Recon Soln, Take as directed by your doctor, Disp: 4000 mL, Rfl: 0    Syringe/Needle, Disp, 18G X 1\" 3 ML Does not apply Misc, Use as directed to inject testosterone into muscle every 2 weeks, Disp: 100 each, Rfl: 0    Insulin Lispro (HUMALOG KWIKPEN) 200 UNIT/ML Subcutaneous Solution Pen-injector, Inject 6 Units into the skin 2 (two) times daily before meals. Inject 6 Units into the skin 2 (two) times daily before meals.Inject 6 Units into the skin 2 (two) times daily before meals., Disp: 15 mL, Rfl: 1    Insulin Glargine, 1 Unit Dial, 300 UNIT/ML Subcutaneous Solution Pen-injector, Inject 65 Units into the skin every evening., Disp: 30 mL, Rfl: 3    naproxen 500 MG Oral Tab, Take 1 tablet (500 mg total) by mouth 2 (two) times daily as needed (pain). Take with food., Disp: 60 tablet, Rfl: 1    Insulin Pen Needle (CARETOUCH PEN NEEDLES) 32G X 4 MM Does not apply Misc, Use as directed to inject insulin 3 times daily, Disp: 400 each, Rfl: 3    Continuous Blood Gluc Sensor (FREESTYLE ED 2 SENSOR) Does not apply Misc, 1 each every 14 (fourteen) days., Disp: 8 each, Rfl: 1    Glucose Blood (BLOOD GLUCOSE TEST) In Vitro Strip, Inject 1 strip into the skin in the morning, at noon, and at bedtime., Disp: 300 strip,  Rfl: 3    Continuous Blood Gluc Sensor (FREESTYLE ED 2 SENSOR) Does not apply Misc, 1 each every 14 (fourteen) days., Disp: 1 each, Rfl: 0    Lancets 33G Does not apply Misc, 1 Lancet by Finger stick route daily., Disp: 100 each, Rfl: 0    Blood Glucose Monitoring Suppl (BLOOD GLUCOSE MONITOR SYSTEM) w/Device Does not apply Kit, Check blood sugar daily, Disp: 1 kit, Rfl: 0    ONETOUCH DELICA LANCETS 33G Does not apply Misc, TEST DAILY AS NEEDED, Disp: 100 each, Rfl: 5    Glucose Blood (ONETOUCH ULTRA BLUE) In Vitro Strip, TEST DAILY AS NEEDED, Disp: 100 strip, Rfl: 5    Glucose Blood (FREESTYLE TEST) In Vitro Strip, Check sugar once daily as needed, Disp: 100 strip, Rfl: 11      Review of Systems  The Review of Systems has been reviewed by me during today.  Review of Systems   Constitutional:  Negative for chills, diaphoresis, fatigue, fever and unexpected weight change.   HENT:  Negative for hearing loss, nosebleeds, sore throat and trouble swallowing.    Respiratory:  Negative for apnea, cough, shortness of breath and wheezing.    Cardiovascular:  Negative for chest pain, palpitations and leg swelling.   Gastrointestinal:  Negative for abdominal distention, abdominal pain, anal bleeding, blood in stool, constipation, diarrhea, nausea and vomiting.   Genitourinary:  Negative for difficulty urinating, dysuria, frequency and urgency.   Musculoskeletal:  Negative for arthralgias and myalgias.   Skin:  Negative for color change and rash.   Neurological:  Negative for tremors, syncope and weakness.   Hematological:  Negative for adenopathy. Does not bruise/bleed easily.   Psychiatric/Behavioral:  Negative for behavioral problems and sleep disturbance.        Physical Findings   /85   Pulse 93   Temp 97.5 °F (36.4 °C) (Temporal)   Resp 18   SpO2 96%   Physical Exam  Vitals and nursing note reviewed.   Constitutional:       General: He is not in acute distress.     Appearance: He is well-developed.   HENT:       Head: Normocephalic and atraumatic.   Eyes:      General: No scleral icterus.     Pupils: Pupils are equal, round, and reactive to light.   Neck:      Vascular: No JVD.      Trachea: No tracheal deviation.   Cardiovascular:      Rate and Rhythm: Normal rate and regular rhythm.   Pulmonary:      Effort: Pulmonary effort is normal. No respiratory distress.      Breath sounds: No stridor.   Abdominal:      General: Bowel sounds are normal. There is no distension.      Palpations: Abdomen is soft. Abdomen is not rigid. There is no mass.      Tenderness: There is no abdominal tenderness. There is no guarding or rebound. Negative signs include Ahuja's sign and McBurney's sign.      Hernia: A hernia is present. Hernia is present in the right inguinal area. There is no hernia in the ventral area or left inguinal area.       Genitourinary:     Penis: Normal.       Testes: Normal.         Right: Mass, tenderness, swelling, testicular hydrocele or varicocele not present. Right testis is descended. Cremasteric reflex is present.          Left: Mass, tenderness, swelling, testicular hydrocele or varicocele not present. Left testis is descended. Cremasteric reflex is present.       Epididymis:      Right: Normal.      Left: Normal.   Musculoskeletal:         General: Normal range of motion.      Cervical back: Normal range of motion and neck supple.   Skin:     General: Skin is warm and dry.   Neurological:      Mental Status: He is alert and oriented to person, place, and time.   Psychiatric:         Behavior: Behavior normal.             Assessment   1. Right inguinal hernia          Plan     The patient has a benign-appearing chronic seroma or lymphocele near his prior colorectal anastomosis.  No intervention required now.    Recent colonoscopy reveals no abnormalities in the area except for a solitary rectal ulcer and repeat flexible sigmoidoscopy is scheduled in the near future for the patient.    Additionally, the  patient reports right inguinal pain and on exam I confirm fat-containing right inguinal hernia.  On review of CT scan there is a lipoma within the right inguinal canal correlating with physical exam findings.    The patient will be scheduled for robotic, possible open right inguinal hernia repair with mesh.    The damaris-operative care plan was discussed with the patient, who voices understanding.  Activity and lifting recommendations were discussed in length.     The risks, benefits, and alternatives to the procedure were explained to the patient.  The risks explained include, but are not limited to, bleeding, infection, pain wound complications, recurrence, incorrect diagnosis, injury to adjacent organs and structures. We also discussed the possibile need for further therapeutic, diagnostic, or surgical intervention.  The patient voiced understanding, and after all questions were answered to the patient's satisfaction, the patient provided willing and informed consent to proceed.     No orders of the defined types were placed in this encounter.      Imaging & Referrals   None    Follow Up  No follow-ups on file.    Rudy Van MD    Date of Surgery:     DX:     ICD-10-CM   1. Right inguinal hernia  K40.90        Surgery Site :RIGHT / LEFT / BILATERAL: right    [] Open  [] Laparoscopic  [x] Robotic, possible open    [] Pilonidal Cystectomy  [] Excision of Lipomas     [] Sigmoidectomies    [] Hemorrhoidectomy   [] Transanal Hemorrhoidal Dearterialization [] Rectal Exam Under Anesthesia  [] Ureteral stent, Urologist  [x] Hernia   [] Ventral  [] Umbilical  [x] Inguinal  [] Incisional  [] Paraesophageal [] Component Separation (TAR)   [] Cholecystectomy  [] Appendectomy    [] Port placement      Anesthesia: ANESTHESIA TYPE: Gen    Location:  [] [facility]   [] [facility]  [] [facility]  [x] [facility]   [] [facility]    Admission Status: EDW OR ADMIT LOCATION: [facility] Needed: Yes/No: Yes: Surgical PA        Clearance Needed:  []Medical Clearance   []Cardiac Clearance:   []Anticoagulation Management:    []Renal:   []Neuro:     Hx sleep apnea:          Pacemaker:        Latex allergies:      Pre-Admission Testing:  Surgeon's Personalized order Set.   Prophylactic Antibiotics Order: Prophylactic antibiotic protocol    Equipment:   [] C-Arm  Other:    Rudy Van MD FACS  Trauma Medical Director, [facility] General Surgery    The 21st Century Cures Act makes medical notes like these available to patients in the interest of transparency. Please be advised this is a medical document. Medical documents are intended to carry relevant information, facts as evident, and the clinical opinion of the practitioner. The medical note is intended as peer to peer communication and may appear blunt or direct. It is written in medical language and may contain abbreviations or verbiage that are unfamiliar.    This note was prepared using Dragon Medical voice recognition dictation software. As a result, errors may occur. When identified, these errors have been corrected. While every attempt is made to correct errors during dictation, discrepancies may still exist.

## 2025-03-27 ENCOUNTER — TELEPHONE (OUTPATIENT)
Facility: LOCATION | Age: 50
End: 2025-03-27

## 2025-03-27 DIAGNOSIS — K40.90 RIGHT INGUINAL HERNIA: Primary | ICD-10-CM

## 2025-03-28 DIAGNOSIS — R79.89 LOW TESTOSTERONE: ICD-10-CM

## 2025-03-28 RX ORDER — TESTOSTERONE CYPIONATE 200 MG/ML
100 INJECTION, SOLUTION INTRAMUSCULAR
Qty: 10 ML | Refills: 0 | Status: SHIPPED | OUTPATIENT
Start: 2025-03-28

## 2025-03-28 NOTE — PROGRESS NOTES
Mauro Quinonez,     Your lab results have been reviewed. Your testosterone level is too high, so we do need to reduce your testosterone medication dose. Please decrease your testosterone dose to 100 mg (0.5 mL) every 2 weeks. Your updated dose instructions have been sent to your pharmacy for you. We will need to recheck your levels 2 months after this dose adjustment. Let's discuss your blood sugar and cholesterol levels during your upcoming visit on 4/10. Please let me know if you have any questions in the meantime.     Dr. Green

## 2025-04-10 ENCOUNTER — OFFICE VISIT (OUTPATIENT)
Dept: FAMILY MEDICINE CLINIC | Facility: CLINIC | Age: 50
End: 2025-04-10
Payer: COMMERCIAL

## 2025-04-10 VITALS
TEMPERATURE: 97 F | WEIGHT: 197 LBS | DIASTOLIC BLOOD PRESSURE: 90 MMHG | HEART RATE: 87 BPM | HEIGHT: 69 IN | RESPIRATION RATE: 16 BRPM | BODY MASS INDEX: 29.18 KG/M2 | OXYGEN SATURATION: 97 % | SYSTOLIC BLOOD PRESSURE: 154 MMHG

## 2025-04-10 DIAGNOSIS — R79.89 LOW TESTOSTERONE: ICD-10-CM

## 2025-04-10 DIAGNOSIS — K75.81 NASH (NONALCOHOLIC STEATOHEPATITIS): ICD-10-CM

## 2025-04-10 DIAGNOSIS — E78.00 HYPERCHOLESTEREMIA: ICD-10-CM

## 2025-04-10 DIAGNOSIS — D58.2 ELEVATED HEMOGLOBIN: ICD-10-CM

## 2025-04-10 DIAGNOSIS — G62.9 NEUROPATHY: ICD-10-CM

## 2025-04-10 DIAGNOSIS — K08.89 PAIN, DENTAL: ICD-10-CM

## 2025-04-10 DIAGNOSIS — Z79.4 TYPE 2 DIABETES MELLITUS WITH HYPERGLYCEMIA, WITH LONG-TERM CURRENT USE OF INSULIN (HCC): Primary | ICD-10-CM

## 2025-04-10 DIAGNOSIS — E11.65 TYPE 2 DIABETES MELLITUS WITH HYPERGLYCEMIA, WITH LONG-TERM CURRENT USE OF INSULIN (HCC): Primary | ICD-10-CM

## 2025-04-10 PROCEDURE — 3052F HG A1C>EQUAL 8.0%<EQUAL 9.0%: CPT | Performed by: STUDENT IN AN ORGANIZED HEALTH CARE EDUCATION/TRAINING PROGRAM

## 2025-04-10 PROCEDURE — 3008F BODY MASS INDEX DOCD: CPT | Performed by: STUDENT IN AN ORGANIZED HEALTH CARE EDUCATION/TRAINING PROGRAM

## 2025-04-10 PROCEDURE — 99214 OFFICE O/P EST MOD 30 MIN: CPT | Performed by: STUDENT IN AN ORGANIZED HEALTH CARE EDUCATION/TRAINING PROGRAM

## 2025-04-10 PROCEDURE — 3077F SYST BP >= 140 MM HG: CPT | Performed by: STUDENT IN AN ORGANIZED HEALTH CARE EDUCATION/TRAINING PROGRAM

## 2025-04-10 PROCEDURE — 3080F DIAST BP >= 90 MM HG: CPT | Performed by: STUDENT IN AN ORGANIZED HEALTH CARE EDUCATION/TRAINING PROGRAM

## 2025-04-10 RX ORDER — IBUPROFEN 600 MG/1
600 TABLET, FILM COATED ORAL ONCE
Status: COMPLETED | OUTPATIENT
Start: 2025-04-10 | End: 2025-04-10

## 2025-04-10 RX ORDER — ACETAMINOPHEN 650 MG/1
650 SUPPOSITORY RECTAL EVERY 4 HOURS PRN
COMMUNITY

## 2025-04-10 RX ORDER — SEMAGLUTIDE 0.68 MG/ML
INJECTION, SOLUTION SUBCUTANEOUS
Qty: 9 ML | Refills: 0 | Status: SHIPPED | OUTPATIENT
Start: 2025-04-10 | End: 2025-04-10

## 2025-04-10 RX ADMIN — IBUPROFEN 600 MG: 600 TABLET, FILM COATED ORAL at 14:08:00

## 2025-04-10 NOTE — PROGRESS NOTES
Subjective:      Chief Complaint   Patient presents with    Diabetes     F/up - labs done 3/26/25 - had DM eye exam done Katina jensen [place]     HISTORY OF PRESENT ILLNESS  HPI  HPI obtained per patient report.  Cristiano Hicks is a pleasant 50 year old male presenting for a follow-up for his diabetes.   He requests Tylenol or Ibuprofen in office for dental pain due to a dental extraction yesterday.     PAST PATIENT HISTORY  Past Medical History[1]  Past Surgical History[2]    CURRENT MEDICATIONS  Medications Taking[3]    HEALTH MAINTENANCE  Immunization History   Administered Date(s) Administered    FLU VAC QIV SPLIT 3 YRS AND OLDER (56834) 11/04/2019, 01/04/2021    Influenza Vaccine, trivalent (IIV3), PF 0.5mL (11494) 12/27/2024    Pneumovax 23 05/04/2018    TDAP 07/21/2015       ALLERGIES AND DRUG REACTIONS  Allergies[4]    Family History[5]  Short Social Hx on File[6]    Review of Systems   All other systems reviewed and are negative.         Objective:      /90   Pulse 87   Temp 97.1 °F (36.2 °C) (Temporal)   Resp 16   Ht 5' 9\" (1.753 m)   Wt 197 lb (89.4 kg)   SpO2 97%   BMI 29.09 kg/m²   Body mass index is 29.09 kg/m².    Physical Exam  Vitals reviewed.   Constitutional:       General: He is not in acute distress.     Appearance: He is not ill-appearing or toxic-appearing.   HENT:      Mouth/Throat:      Mouth: Mucous membranes are moist.      Pharynx: Oropharynx is clear. No posterior oropharyngeal erythema.      Comments: Site of dental extraction of L lower without erythema/edema/discharge    Cardiovascular:      Rate and Rhythm: Normal rate.   Pulmonary:      Effort: Pulmonary effort is normal.   Abdominal:      General: There is no distension.   Musculoskeletal:      Cervical back: Neck supple.      Right lower leg: No edema.      Left lower leg: No edema.   Skin:     Findings: No erythema.   Neurological:      Mental Status: He is alert and oriented to person, place, and time.             Assessment and Plan:      1. Type 2 diabetes mellitus with hyperglycemia, with long-term current use of insulin (HCC) (Primary)  -     Hemoglobin A1C; Future; Expected date: 06/26/2025  -     Discontinue: Ozempic (0.25 or 0.5 MG/DOSE); Inject 0.25 mg into the skin once a week for 30 days, THEN 0.5 mg once a week.  Dispense: 9 mL; Refill: 0  -     Microalb/Creat Ratio, Random Urine; Future; Expected date: 06/26/2025  2. Neuropathy  3. ASTUDILLO (nonalcoholic steatohepatitis)  -     Discontinue: Ozempic (0.25 or 0.5 MG/DOSE); Inject 0.25 mg into the skin once a week for 30 days, THEN 0.5 mg once a week.  Dispense: 9 mL; Refill: 0  4. Low testosterone  -     CBC, Platelet; No Differential; Future; Expected date: 06/26/2025  -     Testosterone Total; Future; Expected date: 06/26/2025  5. Elevated hemoglobin  6. Hypercholesteremia  7. Pain, dental  -     Ibuprofen    Return in about 3 months (around 7/10/2025) for follow-up.    DM2  - complicated by diabetic neuropathy  - his A1C is 8.3  - he states that his diabetic eye exam this year showed mild but stable diabetic retinopathy. He was asked to provide a copy of his exam result, as this will help to determine the formulation of semaglutide recommended for him to start (subcutaneous vs oral)  - continue insulin glargine dose to 65 units at bedtime  - continue short-acting insulin lispro 6 units before meals  - continue checking BG 3-4 times daily   - recommended rechecking his A1C in 3 months and follow-up afterwards   - unable to tolerate jardiance, metformin, and metformin ER due to adverse effects    Low testosterone  - testosterone level recheck was excessively high  - his testosterone replacement dose has been reduced since  - elevated hemoglobin and cholesterol levels may be 2/2 his formerly excessive dose of testosterone  - he was asked to recheck his testosterone and hemoglobin levels with his A1C in 3 months. We will plan to recheck his lipid panel in 6 months        Patient verbalized understanding of assessment and recommendations. All questions and concerns were addressed.    Electronically signed by Mirella Green MD         [1]   Past Medical History:   Abdominal pain    Back pain    Bloating    Constipation    Decorative tattoo    Diabetes (HCC)    Frequent urination    Gastric polyp    Heartburn    High blood pressure    Hyperlipidemia    ASTUDILLO (nonalcoholic steatohepatitis)    Pain in joints    Problems with swallowing    Sleep disturbance    Wears glasses   [2]   Past Surgical History:  Procedure Laterality Date    Colonoscopy      Colonoscopy N/A 02/08/2018    Procedure: COLONOSCOPY, POSSIBLE BIOPSY, POSSIBLE POLYPECTOMY 60116;  Surgeon: Donavan Varela MD;  [facility]    Colostomy      Due to diverticulitis    Hernia surgery      Other surgical history      Skin surgery      Vasectomy  1997   [3]   Outpatient Medications Marked as Taking for the 4/10/25 encounter (Office Visit) with Mirella Green MD   Medication Sig Dispense Refill    acetaminophen 650 MG Rectal Suppos Place 1 suppository (650 mg total) rectally every 4 (four) hours as needed for Fever.      testosterone cypionate 200 mg/mL Intramuscular Solution Inject 0.5 mL (100 mg total) into the muscle every 14 (fourteen) days. 10 mL 0    gabapentin 300 MG Oral Cap Take 1 capsule (300 mg total) by mouth 3 (three) times daily. 270 capsule 0    gabapentin 600 MG Oral Tab Take 1 tablet (600 mg total) by mouth 3 (three) times daily. 270 tablet 0    Syringe/Needle, Disp, 18G X 1\" 3 ML Does not apply Misc Use as directed to inject testosterone into muscle every 2 weeks 100 each 0    Insulin Lispro (HUMALOG KWIKPEN) 200 UNIT/ML Subcutaneous Solution Pen-injector Inject 6 Units into the skin 2 (two) times daily before meals. Inject 6 Units into the skin 2 (two) times daily before meals.Inject 6 Units into the skin 2 (two) times daily before meals. 15 mL 1    Insulin Glargine, 1 Unit Dial, 300  UNIT/ML Subcutaneous Solution Pen-injector Inject 65 Units into the skin every evening. 30 mL 3    naproxen 500 MG Oral Tab Take 1 tablet (500 mg total) by mouth 2 (two) times daily as needed (pain). Take with food. 60 tablet 1    Insulin Pen Needle (CARETOUCH PEN NEEDLES) 32G X 4 MM Does not apply Misc Use as directed to inject insulin 3 times daily 400 each 3    Continuous Blood Gluc Sensor (FREESTYLE ED 2 SENSOR) Does not apply Misc 1 each every 14 (fourteen) days. 8 each 1    Glucose Blood (BLOOD GLUCOSE TEST) In Vitro Strip Inject 1 strip into the skin in the morning, at noon, and at bedtime. 300 strip 3    Continuous Blood Gluc Sensor (FREESTYLE ED 2 SENSOR) Does not apply Misc 1 each every 14 (fourteen) days. 1 each 0    Lancets 33G Does not apply Misc 1 Lancet by Finger stick route daily. 100 each 0    Blood Glucose Monitoring Suppl (BLOOD GLUCOSE MONITOR SYSTEM) w/Device Does not apply Kit Check blood sugar daily 1 kit 0    ONETOUCH DELICA LANCETS 33G Does not apply Misc TEST DAILY AS NEEDED 100 each 5    Glucose Blood (ONETOUCH ULTRA BLUE) In Vitro Strip TEST DAILY AS NEEDED 100 strip 5    Glucose Blood (FREESTYLE TEST) In Vitro Strip Check sugar once daily as needed 100 strip 11   [4]   Allergies  Allergen Reactions    Tramadol NAUSEA AND VOMITING    Metformin DIARRHEA   [5]   Family History  Problem Relation Age of Onset    Diabetes Father     Hypertension Mother     No Known Problems Sister     Colon Cancer Neg     Prostate Cancer Neg    [6]   Social History  Socioeconomic History    Marital status:    Occupational History    Occupation: Sales      Employer: [employer]   Tobacco Use    Smoking status: Every Day     Current packs/day: 1.00     Average packs/day: 1 pack/day for 20.0 years (20.0 ttl pk-yrs)     Types: Cigarettes     Passive exposure: Never    Smokeless tobacco: Never    Tobacco comments:     Patient not interested in counseling at this time   Vaping Use    Vaping status:  Never Used   Substance and Sexual Activity    Alcohol use: Yes     Alcohol/week: 12.0 standard drinks of alcohol     Types: 12 Cans of beer per week    Drug use: Not Currently     Types: Cannabis     Comment: last time 2 days ago- gummies for sleep    Sexual activity: Yes     Partners: Female   Other Topics Concern    Caffeine Concern No    Exercise No    Seat Belt No    Special Diet No    Stress Concern No    Weight Concern No

## 2025-04-14 ENCOUNTER — TELEPHONE (OUTPATIENT)
Dept: PHYSICAL THERAPY | Facility: HOSPITAL | Age: 50
End: 2025-04-14

## 2025-04-15 ENCOUNTER — OFFICE VISIT (OUTPATIENT)
Dept: PHYSICAL THERAPY | Age: 50
End: 2025-04-15
Attending: STUDENT IN AN ORGANIZED HEALTH CARE EDUCATION/TRAINING PROGRAM
Payer: COMMERCIAL

## 2025-04-15 DIAGNOSIS — G89.29 CHRONIC LOW BACK PAIN, UNSPECIFIED BACK PAIN LATERALITY, UNSPECIFIED WHETHER SCIATICA PRESENT: Primary | ICD-10-CM

## 2025-04-15 DIAGNOSIS — M54.50 CHRONIC LOW BACK PAIN, UNSPECIFIED BACK PAIN LATERALITY, UNSPECIFIED WHETHER SCIATICA PRESENT: Primary | ICD-10-CM

## 2025-04-15 PROCEDURE — 97110 THERAPEUTIC EXERCISES: CPT

## 2025-04-15 PROCEDURE — 97162 PT EVAL MOD COMPLEX 30 MIN: CPT

## 2025-04-15 NOTE — PROGRESS NOTES
SPINE EVALUATION:     Diagnosis:   Lumbar OA; Peripheral Neuropathy. Patient:  Cristiano Hicks (50 year old, male)        Referring Provider: Mirella Green  Today's Date   4/15/2025    Precautions:  None   Date of Evaluation: 04/15/25  Next MD visit: No data recorded  Date of Surgery: No data recorded     PATIENT SUMMARY   Summary of chief complaints: increased bilateral forefoot pins and needle tingling and centeral low back pain at iliac crest into the right hip  History of current condition: 20 year history of low back pain; 3 year history of bilateral foot paraesthesia (R>>L)   Pain level: current 4 /10, at best 0 /10, at worst 7 /10  Description of symptoms: bilateral forefoot paraesthesia; central low back pain; pain lessened with compression stockings   Occupation: sales   Leisure activities/Hobbies: work around house; lifting stock cars   Prior level of function: unlimited  Current limitations: increased pain with sustained posturing, walking, exercise  Pt goals: to have less pain and increased funcitonal mobility in general.  Red flag signs/symptoms: Pt denies dizziness, drop attacks, dysphagia, dysarthria, diplopia; Pt denies changes in bowel/bladder function, saddle anesthesia; Pt denies pain that wakes in sleep, fever, recent trauma, history of CA, pain unchanged with movement/activity      Cristiano  has a past medical history of Abdominal pain, Back pain, Bloating, Constipation, Decorative tattoo, Diabetes (HCC), Frequent urination, Gastric polyp, Heartburn, High blood pressure, Hyperlipidemia, ASTUDILLO (nonalcoholic steatohepatitis), Pain in joints, Problems with swallowing, Sleep disturbance, and Wears glasses.  He  has a past surgical history that includes other surgical history; hernia surgery; colostomy; skin surgery; colonoscopy; colonoscopy (N/A, 02/08/2018); and vasectomy (1997).    ASSESSMENT  Cristiano presents to physical therapy evaluation with primary c/o increased bilateral forefoot pins and needle  tingling and centeral low back pain at iliac crest into the right hip. The results of the objective tests and measures show decreased hip mobility, decreased hamstring and posterior hip capsule mobility. Functional deficits include but are not limited to increased pain with sustained posturing, walking, exercise. Signs and symptoms are consistent with diagnosis of Lumbar OA; Peripheral Neuropathy.. Pt and PT discussed evaluation findings, pathology, POC and HEP.  Pt voiced understanding and performs HEP correctly without reported pain. Skilled Physical Therapy is medically necessary to address the above impairments and reach functional goals.    OBJECTIVE:    Musculoskeletal:  Observation/Posture: increased thoracic kyphosis   Accessory Motion: Decreased mobility L5S1.  Decreased hip IR and FLXN due to posterior hip capsule mobility   Palpation: Increased reactivity with palpation at iliac crest     Special tests:   Negative SLR and Slump test.       ROM and Strength:  (* denotes performed with pain)  Trunk ROM     Flex 75% full motion without symptom reproduction.     Ext 75% full motion without symptom reproduction.    R L     Side bend 75% full motion without symptom reproduction. 75% full motion without symptom reproduction.     Rotation 75% full motion without symptom reproduction. 75% full motion without symptom reproduction.       Flexibility:  LE Flexibility R L     Hip Flexor mod restricted mod restricted     Hamstrings significantly restricted significantly restricted     ITB mod restricted mod restricted     Piriformis significantly restricted significantly restricted     Quads mod restricted mod restricted     Gastroc-soleus mod restricted mod restricted       Neurological:  Sensation: grossly intact to light touch ROSANA UE/LE     Deep Tendon Reflexes: grossly intact ROSANA UE/LE     UMN:      Young's:       Clonus: negative     Babinski: negative     Peripheral Neurodynamic: WNL except     Balance and  Functional Mobility:  Gait: pt ambulates on level ground with normal mechanics.  Balance: SLS: EO R 10 sec, EO L 10 sec          Today's Treatment and Response:   Pt education was provided on exam findings, treatment diagnosis, treatment plan, expectations, and prognosis.  Today's Treatment       4/15/2025   Spine Treatment   Therapeutic Exercise Minutes 10   Evaluation Minutes 25   Total Time Of Timed Procedures 10   Total Time Of Service-Based Procedures 25   Total Treatment Time 35   HEP Trunk flexion, hamstring, and calf stretch        Patient was instructed in and issued a HEP for: Trunk flexion, hamstring, and calf stretch    Charges:  PT EVAL: Moderate Complexity, TherEx 1; Eval 1  In agreement with evaluation findings and clinical rationale, this evaluation involved MODERATE COMPLEXITY decision making due to 1-2 personal factors/comorbidities, 3 or more body structures involved/activity limitations, and evolving symptoms as documented in the evaluation.                                                                         PLAN OF CARE:    Goals: (to be met in 12 visits)   1. Improve upon ANN assessment > 11% from INE to DC.  2. Patient will be aware of postural limitations and be able to correct them independently.    3. Patient will have an increase in spinal mobility to >75% in order to return to improved tolerance for sitting, sustained ambulation, and exercise.    4. Patient will have an increase in core strength to assist with returning to sustained posturing.   5. Patient will demonstrate an increase in MLT of  the hamstrings and iliopsoas in order to return to more normalized ambulation and exercise.       Frequency / Duration: Patient will be seen 2x/wkx/week or a total of 12  visits over a 90 day period. Treatment will include: Gait training; Manual Therapy; Neuromuscular Re-education; Therapeutic Activities; Therapeutic Exercise    Education or treatment limitation: None   Rehab Potential:  good     Oswestry Disability Index Score  Score: (Patient-Rptd) 22 % (4/15/2025 12:55 PM)      Patient/Family/Caregiver was advised of these findings, precautions, and treatment options and has agreed to actively participate in planning and for this course of care.    Thank you for your referral. Please co-sign or sign and return this letter via fax as soon as possible to [phone]. If you have any questions, please contact me at Dept: [phone]    Sincerely,  Electronically signed by therapist: Roger Hatfield PT  Physician's certification required: Yes  I certify the need for these services furnished under this plan of treatment and while under my care.    X___________________________________________________ Date____________________    Certification From: 4/15/2025  To:7/14/2025

## 2025-04-18 ENCOUNTER — OFFICE VISIT (OUTPATIENT)
Dept: PHYSICAL THERAPY | Age: 50
End: 2025-04-18
Attending: STUDENT IN AN ORGANIZED HEALTH CARE EDUCATION/TRAINING PROGRAM
Payer: COMMERCIAL

## 2025-04-18 PROCEDURE — 97140 MANUAL THERAPY 1/> REGIONS: CPT

## 2025-04-18 PROCEDURE — 97110 THERAPEUTIC EXERCISES: CPT

## 2025-04-18 NOTE — PROGRESS NOTES
Patient: Cristiano Hicks (50 year old, male) Referring Provider:  Insurance:   Diagnosis: Lumbar OA; Peripheral Neuropathy. [facility]   Date of Surgery: No data recorded Next MD visit:  N/A   Precautions:  None No data recorded Referral Information:    Date of Evaluation: Req: 5, Auth: 5, Exp: 6/30/2025    04/15/25 POC Auth Visits:          Today's Date   4/18/2025    Subjective  States that there is some general soreness from the stretching last session. States that the right hernia is main limitation at this time.  States that he is getting the hernia repaired in September.       Pain: 3/10     Objective  Treatment initiated per log below.       Assessment  Responded well with increased lumbar mobility for all planes of motion after treatment.    Goals (to be met in 12 visits)   1. Improve upon ANN assessment > 11% from INE to DC.  2. Patient will be aware of postural limitations and be able to correct them independently.    3. Patient will have an increase in spinal mobility to >75% in order to return to improved tolerance for sitting, sustained ambulation, and exercise.    4. Patient will have an increase in core strength to assist with returning to sustained posturing.   5. Patient will demonstrate an increase in MLT of  the hamstrings and iliopsoas in order to return to more normalized ambulation and exercise.           Plan  Assess response and progress as tolerated.    Treatment Last 4 Visits  Treatment Day: 2       4/15/2025 4/18/2025   Spine Treatment   Therapeutic Exercise  Elliptical L3 x 5 min;   Pro Stretch 30 sec x 3  Seated lumbar flexion and bilateral sidebend with ball x 10  KTOS 30 sec x 3.    Cable Column Unilateral rows 48# x 20  Side stepping multifidus at cable column 36# x 20  Cable Column bilateral shoulder extension 60# x 20     Manual Therapy  Hip IR and FLXN  ITB Roll x 10 min   Therapeutic Exercise Minutes 10 40   Evaluation Minutes 25    Total Time Of Timed Procedures 10 40    Total Time Of Service-Based Procedures 25 0   Total Treatment Time 35 40   HEP Trunk flexion, hamstring, and calf stretch         HEP  Trunk flexion, hamstring, and calf stretch    Charges  TherEx 3

## 2025-04-22 ENCOUNTER — TELEPHONE (OUTPATIENT)
Dept: PHYSICAL THERAPY | Facility: HOSPITAL | Age: 50
End: 2025-04-22

## 2025-04-22 ENCOUNTER — APPOINTMENT (OUTPATIENT)
Dept: PHYSICAL THERAPY | Age: 50
End: 2025-04-22
Attending: STUDENT IN AN ORGANIZED HEALTH CARE EDUCATION/TRAINING PROGRAM
Payer: COMMERCIAL

## 2025-04-28 ENCOUNTER — OFFICE VISIT (OUTPATIENT)
Dept: PHYSICAL THERAPY | Age: 50
End: 2025-04-28
Attending: STUDENT IN AN ORGANIZED HEALTH CARE EDUCATION/TRAINING PROGRAM
Payer: COMMERCIAL

## 2025-04-28 PROCEDURE — 97140 MANUAL THERAPY 1/> REGIONS: CPT

## 2025-04-28 PROCEDURE — 97110 THERAPEUTIC EXERCISES: CPT

## 2025-04-28 NOTE — PROGRESS NOTES
Patient: Cristiano Hicks (50 year old, male) Referring Provider:  Insurance:   Diagnosis: Lumbar OA; Peripheral Neuropathy. 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  States that the burning in the calves is problematic.       Pain: 3/10     Objective  Treatment progressed per log below. Improved tolreance for LE flexibility in general         Assessment  Responded well with increased lumbar mobility for all planes of motion after treatment. Improved neurdynamics of the LEs.    Goals (to be met in 12 visits)   1. Improve upon ANN assessment > 11% from INE to DC.  2. Patient will be aware of postural limitations and be able to correct them independently.    3. Patient will have an increase in spinal mobility to >75% in order to return to improved tolerance for sitting, sustained ambulation, and exercise.    4. Patient will have an increase in core strength to assist with returning to sustained posturing.   5. Patient will demonstrate an increase in MLT of  the hamstrings and iliopsoas in order to return to more normalized ambulation and exercise.               Plan  Assess response and progress as tolerated.    Treatment Last 4 Visits  Treatment Day: 3       4/15/2025 4/18/2025 4/28/2025   Spine Treatment   Therapeutic Exercise  Elliptical L3 x 5 min;   Pro Stretch 30 sec x 3  Seated lumbar flexion and bilateral sidebend with ball x 10  KTOS 30 sec x 3.    Cable Column Unilateral rows 48# x 20  Side stepping multifidus at cable column 36# x 20  Cable Column bilateral shoulder extension 60# x 20   Elliptical L3 x 5 min;   Pro Stretch 30 sec x 3  Sidelying clamshells green x 20  KTOS 30 sec x 3.    Cable Column Unilateral rows 48# x  20  Side stepping multifidus at cable column 36# x 20  Cable Column bilateral shoulder extension 60# x 20  Shuttle Bilateral Press L6 x 30; Single leg L4 x 20  Closed chain hamstring stretch x10  Closed chain rectus femoris stretch x 10     Manual Therapy  Hip IR and FLXN  ITB Roll x 10 min Hip IR and FLXN  ITB Roll x 10 min     Therapeutic Exercise Minutes 10 40 25   Manual Therapy Minutes   15   Evaluation Minutes 25     Total Time Of Timed Procedures 10 40 40   Total Time Of Service-Based Procedures 25 0 0   Total Treatment Time 35 40 40   HEP Trunk flexion, hamstring, and calf stretch          HEP  Trunk flexion, hamstring, and calf stretch    Charges  TherEx 2; Manual PT 1

## 2025-04-30 ENCOUNTER — TELEPHONE (OUTPATIENT)
Dept: PHYSICAL THERAPY | Age: 50
End: 2025-04-30

## 2025-04-30 ENCOUNTER — APPOINTMENT (OUTPATIENT)
Dept: PHYSICAL THERAPY | Age: 50
End: 2025-04-30
Attending: STUDENT IN AN ORGANIZED HEALTH CARE EDUCATION/TRAINING PROGRAM
Payer: COMMERCIAL

## 2025-05-01 ENCOUNTER — OFFICE VISIT (OUTPATIENT)
Dept: FAMILY MEDICINE CLINIC | Facility: CLINIC | Age: 50
End: 2025-05-01
Payer: COMMERCIAL

## 2025-05-01 VITALS
OXYGEN SATURATION: 98 % | HEIGHT: 69 IN | WEIGHT: 193.25 LBS | SYSTOLIC BLOOD PRESSURE: 152 MMHG | BODY MASS INDEX: 28.62 KG/M2 | TEMPERATURE: 97 F | HEART RATE: 72 BPM | RESPIRATION RATE: 16 BRPM | DIASTOLIC BLOOD PRESSURE: 88 MMHG

## 2025-05-01 DIAGNOSIS — F90.1 ATTENTION DEFICIT HYPERACTIVITY DISORDER (ADHD), PREDOMINANTLY HYPERACTIVE TYPE: ICD-10-CM

## 2025-05-01 DIAGNOSIS — M67.90 NODULE OF FLEXOR TENDON SHEATH: Primary | ICD-10-CM

## 2025-05-01 PROCEDURE — 3008F BODY MASS INDEX DOCD: CPT | Performed by: STUDENT IN AN ORGANIZED HEALTH CARE EDUCATION/TRAINING PROGRAM

## 2025-05-01 PROCEDURE — 3077F SYST BP >= 140 MM HG: CPT | Performed by: STUDENT IN AN ORGANIZED HEALTH CARE EDUCATION/TRAINING PROGRAM

## 2025-05-01 PROCEDURE — 99213 OFFICE O/P EST LOW 20 MIN: CPT | Performed by: STUDENT IN AN ORGANIZED HEALTH CARE EDUCATION/TRAINING PROGRAM

## 2025-05-01 PROCEDURE — 3079F DIAST BP 80-89 MM HG: CPT | Performed by: STUDENT IN AN ORGANIZED HEALTH CARE EDUCATION/TRAINING PROGRAM

## 2025-05-01 RX ORDER — DEXTROAMPHETAMINE SACCHARATE, AMPHETAMINE ASPARTATE MONOHYDRATE, DEXTROAMPHETAMINE SULFATE AND AMPHETAMINE SULFATE 2.5; 2.5; 2.5; 2.5 MG/1; MG/1; MG/1; MG/1
10 CAPSULE, EXTENDED RELEASE ORAL EVERY MORNING
Qty: 30 CAPSULE | Refills: 0 | Status: SHIPPED | OUTPATIENT
Start: 2025-05-01 | End: 2025-05-31

## 2025-05-01 NOTE — PROGRESS NOTES
Subjective:      Chief Complaint   Patient presents with    Bump     Right palm - noticed 2 weeks ago - states it hard and has no pain    Concentration     Would like to discuss medication for focusing      HISTORY OF PRESENT ILLNESS  HPI  HPI obtained per patient report.  Cristiano Hicks is a pleasant 50 year old male presenting with a lump on his R palm.    He reports a new lump on his R palm at the base of his 4th finger. He first noticed 2 weeks ago. He denies any associated pain.     He also requests to discuss treatment options for ADHD. He was diagnosed with ADHD, predominantly hyperactive type, over 15 years ago. He states that he has always had difficulty concentrating, procrastination, and forgetfulness. He has never tried any treatment options. His symptoms are now affecting his work, so he wishes to discuss treatment.     He states his BP is high currently due to work-related stress.      PAST PATIENT HISTORY  Past Medical History[1]  Past Surgical History[2]    CURRENT MEDICATIONS  Medications Taking[3]    HEALTH MAINTENANCE  Immunization History   Administered Date(s) Administered    FLU VAC QIV SPLIT 3 YRS AND OLDER (43285) 11/04/2019, 01/04/2021    Influenza Vaccine, trivalent (IIV3), PF 0.5mL (55976) 12/27/2024    Pneumovax 23 05/04/2018    TDAP 07/21/2015       ALLERGIES AND DRUG REACTIONS  Allergies[4]    Family History[5]  Short Social Hx on File[6]    Review of Systems   Psychiatric/Behavioral:  Positive for decreased concentration.    All other systems reviewed and are negative.         Objective:      /88   Pulse 72   Temp 97 °F (36.1 °C) (Temporal)   Resp 16   Ht 5' 9\" (1.753 m)   Wt 193 lb 4 oz (87.7 kg)   SpO2 98%   BMI 28.54 kg/m²   Body mass index is 28.54 kg/m².    Physical Exam  Vitals reviewed.   Constitutional:       General: He is not in acute distress.     Appearance: He is not ill-appearing, toxic-appearing or diaphoretic.   HENT:      Head: Normocephalic and  atraumatic.   Cardiovascular:      Rate and Rhythm: Normal rate.   Pulmonary:      Effort: Pulmonary effort is normal.   Musculoskeletal:         General: Deformity (R 4th finger tendon sheath firm nodule, approx 4mm in diameter. no clicking/pain with finger flexion) present. No swelling, tenderness or signs of injury. Normal range of motion.      Cervical back: Neck supple.   Skin:     General: Skin is warm and dry.      Coloration: Skin is not jaundiced or pale.      Findings: No bruising, erythema or rash.   Neurological:      Mental Status: He is alert and oriented to person, place, and time.   Psychiatric:         Mood and Affect: Mood normal.            Assessment and Plan:      1. Nodule of flexor tendon sheath (Primary)  2. Attention deficit hyperactivity disorder (ADHD), predominantly hyperactive type  -     Amphetamine-Dextroamphet ER; Take 1 capsule (10 mg total) by mouth every morning.  Dispense: 30 capsule; Refill: 0    Return in about 3 weeks (around 5/22/2025) for follow-up.    R hand flexon tendon sheath nodule  - no associated pain  - recommended regular stretching and activity modification   - he may see a hand specialist if symptoms worsen or if he develops symptoms of trigger finger    ADHD  - we discussed treatment options including therapy and medications  - he declined counseling but is interested in starting pharmacotherapy  - he states that his work schedule currently can be over 12 hours  - a shared decision was made to try adderall ER 10 mg daily. We discussed the R/B/A of this medication  - he is asked to return in 3-4 weeks for follow-up     Patient verbalized understanding of assessment and recommendations. All questions and concerns were addressed.    Electronically signed by Mirella Green MD         [1]   Past Medical History:   Abdominal pain    Back pain    Bloating    Constipation    Decorative tattoo    Diabetes (HCC)    Frequent urination    Gastric polyp    Heartburn    High blood  pressure    Hyperlipidemia    ASTUDILLO (nonalcoholic steatohepatitis)    Pain in joints    Problems with swallowing    Sleep disturbance    Wears glasses   [2]   Past Surgical History:  Procedure Laterality Date    Colonoscopy      Colonoscopy N/A 02/08/2018    Procedure: COLONOSCOPY, POSSIBLE BIOPSY, POSSIBLE POLYPECTOMY 06418;  Surgeon: Donavan Varela MD;  Location: [facility], [facility]    Colostomy      Due to diverticulitis    Hernia surgery      Other surgical history      Skin surgery      Vasectomy  1997   [3]   Outpatient Medications Marked as Taking for the 5/1/25 encounter (Office Visit) with Mirella Green MD   Medication Sig Dispense Refill    amphetamine-dextroamphetamine ER (ADDERALL XR) 10 MG Oral Capsule SR 24 Hr Take 1 capsule (10 mg total) by mouth every morning. 30 capsule 0    acetaminophen 650 MG Rectal Suppos Place 1 suppository (650 mg total) rectally every 4 (four) hours as needed for Fever.      testosterone cypionate 200 mg/mL Intramuscular Solution Inject 0.5 mL (100 mg total) into the muscle every 14 (fourteen) days. 10 mL 0    gabapentin 300 MG Oral Cap Take 1 capsule (300 mg total) by mouth 3 (three) times daily. 270 capsule 0    gabapentin 600 MG Oral Tab Take 1 tablet (600 mg total) by mouth 3 (three) times daily. 270 tablet 0    Syringe/Needle, Disp, 18G X 1\" 3 ML Does not apply Misc Use as directed to inject testosterone into muscle every 2 weeks 100 each 0    Insulin Lispro (HUMALOG KWIKPEN) 200 UNIT/ML Subcutaneous Solution Pen-injector Inject 6 Units into the skin 2 (two) times daily before meals. Inject 6 Units into the skin 2 (two) times daily before meals.Inject 6 Units into the skin 2 (two) times daily before meals. 15 mL 1    Insulin Glargine, 1 Unit Dial, 300 UNIT/ML Subcutaneous Solution Pen-injector Inject 65 Units into the skin every evening. 30 mL 3    naproxen 500 MG Oral Tab Take 1 tablet (500 mg total) by mouth 2 (two) times daily as needed (pain). Take with food. 60  tablet 1    Insulin Pen Needle (CARETOUCH PEN NEEDLES) 32G X 4 MM Does not apply Misc Use as directed to inject insulin 3 times daily 400 each 3    Continuous Blood Gluc Sensor (FREESTYLE ED 2 SENSOR) Does not apply Misc 1 each every 14 (fourteen) days. 8 each 1    Glucose Blood (BLOOD GLUCOSE TEST) In Vitro Strip Inject 1 strip into the skin in the morning, at noon, and at bedtime. 300 strip 3    Continuous Blood Gluc Sensor (FREESTYLE ED 2 SENSOR) Does not apply Misc 1 each every 14 (fourteen) days. 1 each 0    Lancets 33G Does not apply Misc 1 Lancet by Finger stick route daily. 100 each 0    Blood Glucose Monitoring Suppl (BLOOD GLUCOSE MONITOR SYSTEM) w/Device Does not apply Kit Check blood sugar daily 1 kit 0    ONETOUCH DELICA LANCETS 33G Does not apply Misc TEST DAILY AS NEEDED 100 each 5    Glucose Blood (ONETOUCH ULTRA BLUE) In Vitro Strip TEST DAILY AS NEEDED 100 strip 5    Glucose Blood (FREESTYLE TEST) In Vitro Strip Check sugar once daily as needed 100 strip 11   [4]   Allergies  Allergen Reactions    Tramadol NAUSEA AND VOMITING    Metformin DIARRHEA   [5]   Family History  Problem Relation Age of Onset    Diabetes Father     Hypertension Mother     No Known Problems Sister     Colon Cancer Neg     Prostate Cancer Neg    [6]   Social History  Socioeconomic History    Marital status:    Occupational History    Occupation: Sales      Employer: [employer]   Tobacco Use    Smoking status: Every Day     Current packs/day: 1.00     Average packs/day: 1 pack/day for 20.0 years (20.0 ttl pk-yrs)     Types: Cigarettes     Passive exposure: Never    Smokeless tobacco: Never    Tobacco comments:     Patient not interested in counseling at this time   Vaping Use    Vaping status: Never Used   Substance and Sexual Activity    Alcohol use: Yes     Alcohol/week: 12.0 standard drinks of alcohol     Types: 12 Cans of beer per week    Drug use: Not Currently     Types: Cannabis     Comment: last time  2 days ago- gummies for sleep    Sexual activity: Yes     Partners: Female   Other Topics Concern    Caffeine Concern No    Exercise No    Seat Belt No    Special Diet No    Stress Concern No    Weight Concern No

## 2025-05-06 ENCOUNTER — APPOINTMENT (OUTPATIENT)
Dept: PHYSICAL THERAPY | Age: 50
End: 2025-05-06
Attending: STUDENT IN AN ORGANIZED HEALTH CARE EDUCATION/TRAINING PROGRAM
Payer: COMMERCIAL

## 2025-05-08 PROBLEM — K62.6 ULCER OF ANUS AND RECTUM: Status: ACTIVE | Noted: 2025-05-08

## 2025-05-08 PROBLEM — K29.70 GASTRITIS WITH INTESTINAL METAPLASIA OF STOMACH: Status: ACTIVE | Noted: 2025-05-08

## 2025-05-08 PROBLEM — K31.A0 GASTRITIS WITH INTESTINAL METAPLASIA OF STOMACH: Status: ACTIVE | Noted: 2025-05-08

## 2025-05-08 PROCEDURE — 88305 TISSUE EXAM BY PATHOLOGIST: CPT | Performed by: STUDENT IN AN ORGANIZED HEALTH CARE EDUCATION/TRAINING PROGRAM

## 2025-05-13 ENCOUNTER — APPOINTMENT (OUTPATIENT)
Dept: PHYSICAL THERAPY | Age: 50
End: 2025-05-13
Attending: STUDENT IN AN ORGANIZED HEALTH CARE EDUCATION/TRAINING PROGRAM
Payer: COMMERCIAL

## 2025-05-18 DIAGNOSIS — Z79.4 TYPE 2 DIABETES MELLITUS WITH HYPERGLYCEMIA, WITH LONG-TERM CURRENT USE OF INSULIN (HCC): ICD-10-CM

## 2025-05-18 DIAGNOSIS — E11.65 TYPE 2 DIABETES MELLITUS WITH HYPERGLYCEMIA, WITH LONG-TERM CURRENT USE OF INSULIN (HCC): ICD-10-CM

## 2025-05-21 ENCOUNTER — APPOINTMENT (OUTPATIENT)
Dept: PHYSICAL THERAPY | Age: 50
End: 2025-05-21
Attending: STUDENT IN AN ORGANIZED HEALTH CARE EDUCATION/TRAINING PROGRAM
Payer: COMMERCIAL

## 2025-05-22 RX ORDER — INSULIN GLARGINE 300 U/ML
INJECTION, SOLUTION SUBCUTANEOUS
Qty: 30 ML | Refills: 3 | Status: SHIPPED | OUTPATIENT
Start: 2025-05-22

## 2025-05-22 NOTE — TELEPHONE ENCOUNTER
Requesting Toangel Warren  LOV: 5/1/25  RTC: 3 weeks  Last Relevant Labs: 3/26/25  Filled: 12/27/24 #30ml with 3 refills    No future appointments.    Diabetes Medication Protocol [id number]/22/2025 12:53 PM   Protocol Details   Last A1C < 7.5 and within past 6 months    Microalbumin procedure in past 12 months or taking ACE/ARB    Medication is active on med list    In person appointment or virtual visit in the past 6 mos or appointment in next 3 mos    EGFRCR or GFRNAA > 50    GFR in the past 12 months     Rx sent to pharmacy per protocol

## 2025-05-28 ENCOUNTER — APPOINTMENT (OUTPATIENT)
Dept: PHYSICAL THERAPY | Age: 50
End: 2025-05-28
Attending: STUDENT IN AN ORGANIZED HEALTH CARE EDUCATION/TRAINING PROGRAM
Payer: COMMERCIAL

## 2025-05-28 DIAGNOSIS — F90.1 ATTENTION DEFICIT HYPERACTIVITY DISORDER (ADHD), PREDOMINANTLY HYPERACTIVE TYPE: ICD-10-CM

## 2025-05-28 DIAGNOSIS — R79.89 LOW TESTOSTERONE: ICD-10-CM

## 2025-05-29 ENCOUNTER — PATIENT MESSAGE (OUTPATIENT)
Dept: FAMILY MEDICINE CLINIC | Facility: CLINIC | Age: 50
End: 2025-05-29

## 2025-05-29 DIAGNOSIS — E11.65 TYPE 2 DIABETES MELLITUS WITH HYPERGLYCEMIA, WITH LONG-TERM CURRENT USE OF INSULIN (HCC): ICD-10-CM

## 2025-05-29 DIAGNOSIS — Z79.4 TYPE 2 DIABETES MELLITUS WITH HYPERGLYCEMIA, WITH LONG-TERM CURRENT USE OF INSULIN (HCC): ICD-10-CM

## 2025-05-30 RX ORDER — INSULIN GLARGINE 300 U/ML
65 INJECTION, SOLUTION SUBCUTANEOUS EVERY EVENING
Qty: 21 ML | Refills: 2 | Status: SHIPPED | OUTPATIENT
Start: 2025-05-30

## 2025-05-30 NOTE — TELEPHONE ENCOUNTER
Disp Refills Start End     TOUJEO SOLOSTAR 300 UNIT/ML Subcutaneous Solution Pen-injector 30 mL 3 5/22/2025 --    Sig: INJECT 45 UNITS INTO SKIN ONCE EVERY EVENING    Sent to pharmacy as: Toujeo SoloStar 300 UNIT/ML Subcutaneous Solution Pen-injector (Insulin Glargine (1 Unit Dial))    E-Prescribing Status: Receipt confirmed by pharmacy (5/22/2025 12:56 PM CDT)      OV 4/10/25 for DM  DM2  - complicated by diabetic neuropathy  - his A1C is 8.3  - he states that his diabetic eye exam this year showed mild but stable diabetic retinopathy. He was asked to provide a copy of his exam result, as this will help to determine the formulation of semaglutide recommended for him to start (subcutaneous vs oral)  - continue insulin glargine dose to 65 units at bedtime  - continue short-acting insulin lispro 6 units before meals  - continue checking BG 3-4 times daily   - recommended rechecking his A1C in 3 months and follow-up afterwards   - unable to tolerate jardiance, metformin, and metformin ER due to adverse effects     Future Appointments   Date Time Provider Department Center   6/16/2025  6:30 PM Mirella Green MD [facility] [address] [facility] [address]      RX resent for 65 units

## 2025-06-04 RX ORDER — DEXTROAMPHETAMINE SACCHARATE, AMPHETAMINE ASPARTATE MONOHYDRATE, DEXTROAMPHETAMINE SULFATE AND AMPHETAMINE SULFATE 2.5; 2.5; 2.5; 2.5 MG/1; MG/1; MG/1; MG/1
10 CAPSULE, EXTENDED RELEASE ORAL EVERY MORNING
Qty: 30 CAPSULE | Refills: 0 | OUTPATIENT
Start: 2025-06-04 | End: 2025-07-04

## 2025-06-05 RX ORDER — TESTOSTERONE CYPIONATE 200 MG/ML
100 INJECTION, SOLUTION INTRAMUSCULAR
Qty: 10 ML | Refills: 0 | Status: SHIPPED | OUTPATIENT
Start: 2025-06-05

## 2025-06-08 DIAGNOSIS — Z79.4 TYPE 2 DIABETES MELLITUS WITH HYPERGLYCEMIA, WITH LONG-TERM CURRENT USE OF INSULIN (HCC): ICD-10-CM

## 2025-06-08 DIAGNOSIS — E11.65 TYPE 2 DIABETES MELLITUS WITH HYPERGLYCEMIA, WITH LONG-TERM CURRENT USE OF INSULIN (HCC): ICD-10-CM

## 2025-06-09 ENCOUNTER — PATIENT MESSAGE (OUTPATIENT)
Dept: FAMILY MEDICINE CLINIC | Facility: CLINIC | Age: 50
End: 2025-06-09

## 2025-06-09 DIAGNOSIS — S61.219A CUT OF FINGER: Primary | ICD-10-CM

## 2025-06-11 ENCOUNTER — NURSE ONLY (OUTPATIENT)
Dept: FAMILY MEDICINE CLINIC | Facility: CLINIC | Age: 50
End: 2025-06-11
Payer: COMMERCIAL

## 2025-06-11 DIAGNOSIS — Z23 NEED FOR TDAP VACCINATION: Primary | ICD-10-CM

## 2025-06-11 DIAGNOSIS — S61.219A CUT OF FINGER: ICD-10-CM

## 2025-06-11 PROCEDURE — 90715 TDAP VACCINE 7 YRS/> IM: CPT | Performed by: FAMILY MEDICINE

## 2025-06-11 PROCEDURE — 90471 IMMUNIZATION ADMIN: CPT | Performed by: FAMILY MEDICINE

## 2025-06-11 RX ORDER — BLOOD SUGAR DIAGNOSTIC
STRIP MISCELLANEOUS
Qty: 300 STRIP | Refills: 3 | Status: SHIPPED | OUTPATIENT
Start: 2025-06-11

## 2025-06-11 NOTE — PROGRESS NOTES
Patient here for TDAP.   Given IM in R deltoid.   Patient tolerated well with no adverse events.   Patient left office in stable condition.

## 2025-06-11 NOTE — TELEPHONE ENCOUNTER
Requesting Test strips  LOV: 6/3/25  RTC: 2 months  Last Relevant Labs: 3/26/25  Filled: 3/13/24 #300 with 3 refills    No future appointments.    Diabetic Supplies Protocol [id number]/08/2025 06:39 PM   Protocol Details   Medication is active on med list    In person appointment or virtual visit in the past 12 mos or appointment in next 3 mos     Rx sent to pharmacy per protocol

## 2025-06-29 DIAGNOSIS — F90.1 ATTENTION DEFICIT HYPERACTIVITY DISORDER (ADHD), PREDOMINANTLY HYPERACTIVE TYPE: ICD-10-CM

## 2025-06-30 RX ORDER — DEXTROAMPHETAMINE SACCHARATE, AMPHETAMINE ASPARTATE MONOHYDRATE, DEXTROAMPHETAMINE SULFATE AND AMPHETAMINE SULFATE 2.5; 2.5; 2.5; 2.5 MG/1; MG/1; MG/1; MG/1
10 CAPSULE, EXTENDED RELEASE ORAL DAILY
Qty: 30 CAPSULE | Refills: 0 | OUTPATIENT
Start: 2025-06-30 | End: 2025-07-30